# Patient Record
Sex: FEMALE | Race: BLACK OR AFRICAN AMERICAN | Employment: FULL TIME | URBAN - METROPOLITAN AREA
[De-identification: names, ages, dates, MRNs, and addresses within clinical notes are randomized per-mention and may not be internally consistent; named-entity substitution may affect disease eponyms.]

---

## 2017-09-25 ENCOUNTER — HOSPITAL ENCOUNTER (OUTPATIENT)
Dept: ULTRASOUND IMAGING | Age: 42
Discharge: HOME | End: 2017-09-25
Attending: INTERNAL MEDICINE
Payer: COMMERCIAL

## 2017-09-25 DIAGNOSIS — E04.8 MEDIASTINAL GOITER: ICD-10-CM

## 2017-09-25 PROCEDURE — 76536 US EXAM OF HEAD AND NECK: CPT

## 2017-10-05 ENCOUNTER — HOSPITAL ENCOUNTER (OUTPATIENT)
Dept: NUCLEAR MEDICINE | Age: 42
Discharge: HOME | End: 2017-10-05
Payer: COMMERCIAL

## 2017-10-05 DIAGNOSIS — E05.90 HYPERTHYROIDISM: ICD-10-CM

## 2017-10-06 ENCOUNTER — HOSPITAL ENCOUNTER (OUTPATIENT)
Dept: NUCLEAR MEDICINE | Age: 42
Discharge: HOME | End: 2017-10-06
Payer: COMMERCIAL

## 2017-10-06 PROCEDURE — 78014 THYROID IMAGING W/BLOOD FLOW: CPT

## 2017-10-14 ENCOUNTER — HOSPITAL ENCOUNTER (EMERGENCY)
Age: 42
Discharge: HOME | End: 2017-10-14
Attending: EMERGENCY MEDICINE
Payer: COMMERCIAL

## 2017-10-14 VITALS
SYSTOLIC BLOOD PRESSURE: 134 MMHG | TEMPERATURE: 98.4 F | WEIGHT: 220 LBS | HEART RATE: 91 BPM | RESPIRATION RATE: 20 BRPM | HEIGHT: 69 IN | BODY MASS INDEX: 32.58 KG/M2 | DIASTOLIC BLOOD PRESSURE: 81 MMHG | OXYGEN SATURATION: 100 %

## 2017-10-14 DIAGNOSIS — S80.211A ABRASION OF KNEE, INFECTED, RIGHT, INITIAL ENCOUNTER: Primary | ICD-10-CM
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
## 2017-10-14 DIAGNOSIS — L08.9 ABRASION OF KNEE, INFECTED, RIGHT, INITIAL ENCOUNTER: Primary | ICD-10-CM

## 2017-10-14 PROCEDURE — 74011000250 HC RX REV CODE- 250: Performed by: EMERGENCY MEDICINE

## 2017-10-14 PROCEDURE — 90471 IMMUNIZATION ADMIN: CPT

## 2017-10-14 PROCEDURE — 90715 TDAP VACCINE 7 YRS/> IM: CPT | Performed by: EMERGENCY MEDICINE

## 2017-10-14 PROCEDURE — 74011250636 HC RX REV CODE- 250/636: Performed by: EMERGENCY MEDICINE

## 2017-10-14 PROCEDURE — 99282 EMERGENCY DEPT VISIT SF MDM: CPT

## 2017-10-14 RX ORDER — METHIMAZOLE 10 MG/1
10 TABLET ORAL DAILY
COMMUNITY
End: 2018-10-14

## 2017-10-14 RX ORDER — PROPRANOLOL HYDROCHLORIDE 120 MG/1
120 CAPSULE, EXTENDED RELEASE ORAL DAILY
COMMUNITY
End: 2018-10-14

## 2017-10-14 RX ORDER — BACITRACIN ZINC 500 UNIT/G
OINTMENT (GRAM) TOPICAL
Status: COMPLETED | OUTPATIENT
Start: 2017-10-14 | End: 2017-10-14

## 2017-10-14 RX ADMIN — TETANUS TOXOID, REDUCED DIPHTHERIA TOXOID AND ACELLULAR PERTUSSIS VACCINE, ADSORBED 0.5 ML: 5; 2.5; 8; 8; 2.5 SUSPENSION INTRAMUSCULAR at 18:37

## 2017-10-14 RX ADMIN — BACITRACIN ZINC: 500 OINTMENT TOPICAL at 18:49

## 2017-10-14 NOTE — ED PROVIDER NOTES
HPI Comments: 6:11 Momo Delvalle is a 36 y.o. female with PMHx of Grave's disease presenting to the ED c/o right knee injury after fall 5 days ago. Pt states her foot got caught in her pants when she was walking and she fell and hit her knee on the ground. Noted abrasion to right knee. States she has been covering abrasion with band-aids since the fall and was unsure if this was the right method of treatment. Reports pain only with movement. Reports recent diagnosis of Grave's disease 6 days ago and expresses concern for infection. States she has never had a tetanus shot. Denies any other symptoms or complaints. Patient is a 36 y.o. female presenting with knee injury. Knee Injury    Pertinent negatives include no back pain. Past Medical History:   Diagnosis Date    Ectopic pregnancy     Hypertension     Ill-defined condition     Graves Disease       Past Surgical History:   Procedure Laterality Date    HX GYN      leep    HX GYN      d&c    HX GYN      elective     HX TUBAL LIGATION  2016         Family History:   Problem Relation Age of Onset    Diabetes Father     Alzheimer Mother        Social History     Social History    Marital status:      Spouse name: N/A    Number of children: N/A    Years of education: N/A     Occupational History    Not on file. Social History Main Topics    Smoking status: Current Every Day Smoker     Packs/day: 0.50     Years: 20.00     Types: Cigarettes    Smokeless tobacco: Never Used    Alcohol use No    Drug use: No    Sexual activity: Yes     Birth control/ protection: Surgical     Other Topics Concern    Not on file     Social History Narrative         ALLERGIES: Latex; Amoxicillin-pot clavulanate; Fluticasone-salmeterol; and Mometasone    Review of Systems   Constitutional: Negative for chills, fatigue and fever. HENT: Negative for congestion, ear pain, rhinorrhea and sore throat.     Eyes: Negative for pain, redness and itching. Respiratory: Negative for cough, chest tightness and shortness of breath. Cardiovascular: Negative for chest pain, palpitations and leg swelling. Gastrointestinal: Negative for abdominal pain, diarrhea, nausea and vomiting. Genitourinary: Negative for decreased urine volume, dysuria, flank pain, hematuria and pelvic pain. Musculoskeletal: Negative for arthralgias, back pain, joint swelling and myalgias. Skin: Positive for wound (abrasion to right knee ). Negative for color change, pallor and rash. Neurological: Negative for dizziness, weakness and headaches. Hematological: Negative for adenopathy. Does not bruise/bleed easily. Vitals:    10/14/17 1742   BP: 134/81   Pulse: 91   Resp: 20   Temp: 98.4 °F (36.9 °C)   SpO2: 100%   Weight: 99.8 kg (220 lb)   Height: 5' 9\" (1.753 m)            Physical Exam   Constitutional: No distress. HENT:   Head: Normocephalic and atraumatic. Mouth/Throat: Oropharynx is clear and moist.   Eyes: Conjunctivae and EOM are normal. Pupils are equal, round, and reactive to light. Neck: Normal range of motion. Neck supple. Cardiovascular: Normal rate, regular rhythm and normal heart sounds. No murmur heard. Pulmonary/Chest: Effort normal and breath sounds normal. She has no wheezes. She has no rales. Abdominal: Soft. Bowel sounds are normal. She exhibits no distension. There is no tenderness. Musculoskeletal: Normal range of motion. She exhibits no edema or deformity. Right knee: She exhibits normal range of motion, no ecchymosis, no deformity, normal alignment and no bony tenderness. No tenderness found. Left knee: Normal.        Legs:  Abrasion right knee, superficial, 2 cm x 2 cm   Lymphadenopathy:     She has no cervical adenopathy. Neurological: She is alert. She exhibits normal muscle tone. Coordination normal.   Skin: Skin is warm and dry. No rash noted. She is not diaphoretic.    2 cm x 2 cm abrasion below right patella. No surrounding erythema or warmth. No drainage. Psychiatric: She has a normal mood and affect. Her behavior is normal.        [facility]  ED Course       Procedures      Vitals:  Patient Vitals for the past 12 hrs:   Temp Pulse Resp BP SpO2   10/14/17 1742 98.4 °F (36.9 °C) 91 20 134/81 100 %       Medications Ordered:  Medications   diph,Pertuss(AC),Tet Vac-PF (BOOSTRIX) suspension 0.5 mL (not administered)   bacitracin zinc (BACITRACIN) 500 unit/gram ointment (not administered)       Lab Findings:  No results found for this or any previous visit (from the past 12 hour(s)). X-ray, CT or radiology findings or impressions:  No orders to display       Progress notes, consult notes, or additional procedure notes:    Superficial abrasion to right knee with no signs of infection. [place] knee rule negative. Tetanus updated. Instructed on wound care and topical abx application, return precautions with signs of infection      Diagnosis:   1. Abrasion of knee, infected, right, initial encounter    2. Fall, initial encounter        Disposition: Discharge    Follow-up Information     None           Patient's Medications   Start Taking    No medications on file   Continue Taking    AMLODIPINE (NORVASC) 5 MG TABLET        METHIMAZOLE (TAPAZOLE) 10 MG TABLET    Take 10 mg by mouth daily. Indications: 3 tabs    PROPRANOLOL LA (INDERAL LA) 120 MG SR CAPSULE    Take 120 mg by mouth daily. These Medications have changed    No medications on file   Stop Taking    DIAZEPAM (VALIUM) 5 MG TABLET    Take 1 Tab by mouth nightly as needed (muscle pain). Max Daily Amount: 5 mg.     METOPROLOL SUCCINATE (TOPROL-XL) 25 MG XL TABLET           Scribe Attestation     Chalo Anderson acting as a scribe for and in the presence of Gifty Crowley MD      October 14, 2017 at 6:29 PM       Provider Attestation:      I personally performed the services described in the documentation, reviewed the documentation, as recorded by the scribe in my presence, and it accurately and completely records my words and actions.  October 14, 2017 at 6:29 PM - Ira Chiang MD

## 2017-10-14 NOTE — ED TRIAGE NOTES
Pt states she fell on Tuesday. Has healing abrasion to right knee.  Wants to be checked out because she was dx'd with Graves disease on Monday and she is concerned she may have an infection

## 2017-10-14 NOTE — DISCHARGE INSTRUCTIONS
APPLY ANTIBIOTIC OINTMENT (SUCH AS BACITRACIN) TO THE AFFECTED AREA TWO TIMES PER DAY. IF YOU HAVE SEVERE PAIN, REDNESS, DRAINAGE, WARMTH, FEVERS OR ANY WORRYING SIGNS THEN RETURN TO BE CHECKED AGAIN. Scrapes (Abrasions): Care Instructions  Your Care Instructions  Scrapes (abrasions) are wounds where your skin has been rubbed or torn off. Most scrapes do not go deep into the skin, but some may remove several layers of skin. Scrapes usually don't bleed much, but they may ooze pinkish fluid. Scrapes on the head or face may appear worse than they are. They may bleed a lot because of the good blood supply to this area. Most scrapes heal well and may not need a bandage. They usually heal within 3 to 7 days. A large, deep scrape may take 1 to 2 weeks or longer to heal. A scab may form on some scrapes. Follow-up care is a key part of your treatment and safety. Be sure to make and go to all appointments, and call your doctor if you are having problems. It's also a good idea to know your test results and keep a list of the medicines you take. How can you care for yourself at home? · If your doctor told you how to care for your wound, follow your doctor's instructions. If you did not get instructions, follow this general advice:  ¨ Wash the scrape with clean water 2 times a day. Don't use hydrogen peroxide or alcohol, which can slow healing. ¨ You may cover the scrape with a thin layer of petroleum jelly, such as Vaseline, and a nonstick bandage. ¨ Apply more petroleum jelly and replace the bandage as needed. · Prop up the injured area on a pillow anytime you sit or lie down during the next 3 days. Try to keep it above the level of your heart. This will help reduce swelling. · Be safe with medicines. Take pain medicines exactly as directed. ¨ If the doctor gave you a prescription medicine for pain, take it as prescribed.   ¨ If you are not taking a prescription pain medicine, ask your doctor if you can take an over-the-counter medicine. When should you call for help? Call your doctor now or seek immediate medical care if:  · You have signs of infection, such as:  ¨ Increased pain, swelling, warmth, or redness around the scrape. ¨ Red streaks leading from the scrape. ¨ Pus draining from the scrape. ¨ A fever. · The scrape starts to bleed, and blood soaks through the bandage. Oozing small amounts of blood is normal.  Watch closely for changes in your health, and be sure to contact your doctor if the scrape is not getting better each day. Where can you learn more? Go to http://margie-nicholas.info/. Enter A374 in the search box to learn more about \"Scrapes (Abrasions): Care Instructions. \"  Current as of: March 20, 2017  Content Version: 11.3  © 9853-5072 [employer]. Care instructions adapted under license by [employer] (which disclaims liability or warranty for this information). If you have questions about a medical condition or this instruction, always ask your healthcare professional. [place] [address] any warranty or liability for your use of this information.

## 2018-10-14 ENCOUNTER — APPOINTMENT (OUTPATIENT)
Dept: GENERAL RADIOLOGY | Age: 43
End: 2018-10-14
Attending: PHYSICIAN ASSISTANT
Payer: COMMERCIAL

## 2018-10-14 ENCOUNTER — HOSPITAL ENCOUNTER (EMERGENCY)
Age: 43
Discharge: HOME | End: 2018-10-14
Attending: EMERGENCY MEDICINE
Payer: COMMERCIAL

## 2018-10-14 VITALS
OXYGEN SATURATION: 100 % | BODY MASS INDEX: 32.58 KG/M2 | HEIGHT: 69 IN | HEART RATE: 89 BPM | DIASTOLIC BLOOD PRESSURE: 84 MMHG | RESPIRATION RATE: 18 BRPM | SYSTOLIC BLOOD PRESSURE: 150 MMHG | TEMPERATURE: 98.6 F | WEIGHT: 220 LBS

## 2018-10-14 DIAGNOSIS — J06.9 VIRAL URI WITH COUGH: Primary | ICD-10-CM

## 2018-10-14 PROCEDURE — 71046 X-RAY EXAM CHEST 2 VIEWS: CPT

## 2018-10-14 PROCEDURE — 99282 EMERGENCY DEPT VISIT SF MDM: CPT

## 2018-10-14 RX ORDER — BENZONATATE 100 MG/1
100 CAPSULE ORAL
Qty: 30 CAP | Refills: 0 | Status: SHIPPED | OUTPATIENT
Start: 2018-10-14 | End: 2018-10-14

## 2018-10-14 RX ORDER — LORATADINE AND PSEUDOEPHEDRINE 10; 240 MG/1; MG/1
1 TABLET, EXTENDED RELEASE ORAL DAILY
Qty: 10 TAB | Refills: 0 | Status: SHIPPED | OUTPATIENT
Start: 2018-10-14

## 2018-10-14 RX ORDER — FLUTICASONE PROPIONATE 50 MCG
2 SPRAY, SUSPENSION (ML) NASAL DAILY
Qty: 1 BOTTLE | Refills: 0 | Status: SHIPPED | OUTPATIENT
Start: 2018-10-14

## 2018-10-14 RX ORDER — BENZONATATE 100 MG/1
100 CAPSULE ORAL
Qty: 30 CAP | Refills: 0 | Status: SHIPPED | OUTPATIENT
Start: 2018-10-14 | End: 2018-10-21

## 2018-10-14 RX ORDER — FLUTICASONE PROPIONATE 50 MCG
2 SPRAY, SUSPENSION (ML) NASAL DAILY
Qty: 1 BOTTLE | Refills: 0 | Status: SHIPPED | OUTPATIENT
Start: 2018-10-14 | End: 2018-10-14

## 2018-10-14 RX ORDER — ALBUTEROL SULFATE 90 UG/1
AEROSOL, METERED RESPIRATORY (INHALATION)
COMMUNITY

## 2018-10-14 RX ORDER — LORATADINE AND PSEUDOEPHEDRINE 10; 240 MG/1; MG/1
1 TABLET, EXTENDED RELEASE ORAL DAILY
Qty: 10 TAB | Refills: 0 | Status: SHIPPED | OUTPATIENT
Start: 2018-10-14 | End: 2018-10-14

## 2018-10-14 NOTE — ED PROVIDER NOTES
EMERGENCY DEPARTMENT HISTORY AND PHYSICAL EXAM 
 
5:32 PM 
 
 
Date: 10/14/2018 Patient Name: Kristina Lee History of Presenting Illness Chief Complaint Patient presents with  Cough  Nasal Congestion History Provided By: Patient Chief Complaint: cough Duration: 1 Weeks Modifying Factors:  Flonase,  
Associated Symptoms: congestion, cough Additional History (Context): Kristina Lee is a 39 y.o. female with hypertension and Graves Disease  who presents with upper respiratory symptoms. She has nasal congestion and cough, sore throat earlier in the week. Productive cough. Symptoms have been intermittent. Everyone at home is also sick. She talked to her doctor who recommended OTC meds but she has not taken anything. Denies chest pain or fevers. PCP: Grace Alexander MD 
 
Current Outpatient Prescriptions Medication Sig Dispense Refill  METHIMAZOLE PO Take 2.5 mg by mouth daily.  albuterol (PROVENTIL HFA, VENTOLIN HFA, PROAIR HFA) 90 mcg/actuation inhaler Take  by inhalation.  loratadine-pseudoephedrine (CLARITIN-D 24 HOUR)  mg per tablet Take 1 Tab by mouth daily. 10 Tab 0  
 benzonatate (TESSALON PERLES) 100 mg capsule Take 1 Cap by mouth three (3) times daily as needed for Cough for up to 7 days. 30 Cap 0  
 fluticasone (FLONASE) 50 mcg/actuation nasal spray 2 Sprays by Both Nostrils route daily. 1 Bottle 0  
 amLODIPine (NORVASC) 5 mg tablet   3 Past History Past Medical History: 
Past Medical History:  
Diagnosis Date  Ectopic pregnancy  Hypertension  Ill-defined condition Graves Disease Past Surgical History: 
Past Surgical History:  
Procedure Laterality Date  HX GYN    
 leep  HX GYN    
 d&c  
 HX GYN    
 elective   HX TUBAL LIGATION  2016 Family History: 
Family History Problem Relation Age of Onset  Diabetes Father  Alzheimer Mother Social History: Social History Substance Use Topics  Smoking status: Current Every Day Smoker Packs/day: 0.50 Years: 20.00 Types: Cigarettes  Smokeless tobacco: Never Used  Alcohol use No  
 
 
Allergies: Allergies Allergen Reactions  Latex Other (comments) Topical burning  Amoxicillin-Pot Clavulanate Other (comments) Yeast infection  Fluticasone-Salmeterol Unknown (comments)  Mometasone Unknown (comments) Review of Systems Review of Systems Constitutional: Negative for fever. HENT: Positive for congestion. Negative for facial swelling. Eyes: Negative for visual disturbance. Respiratory: Positive for cough. Negative for shortness of breath. Cardiovascular: Negative for chest pain. Gastrointestinal: Negative for abdominal pain. Genitourinary: Negative for dysuria. Musculoskeletal: Negative for neck pain. Skin: Negative for rash. Neurological: Negative for dizziness. Psychiatric/Behavioral: Negative for confusion. All other systems reviewed and are negative. Physical Exam  
 
Visit Vitals  /84 (BP 1 Location: Left arm, BP Patient Position: At rest)  Pulse 89  Temp 98.6 °F (37 °C)  Resp 18  Ht 5' 8.5\" (1.74 m)  Wt 99.8 kg (220 lb)  LMP 09/18/2018  SpO2 100%  BMI 32.96 kg/m2 Physical Exam  
Constitutional: She is oriented to person, place, and time. She appears well-developed and well-nourished. HENT:  
Head: Normocephalic. Right Ear: Tympanic membrane, external ear and ear canal normal.  
Left Ear: Tympanic membrane, external ear and ear canal normal.  
Nose: Nose normal. Right sinus exhibits no maxillary sinus tenderness and no frontal sinus tenderness. Left sinus exhibits no maxillary sinus tenderness and no frontal sinus tenderness.   
Mouth/Throat: Uvula is midline, oropharynx is clear and moist and mucous membranes are normal. No oropharyngeal exudate, posterior oropharyngeal edema, posterior oropharyngeal erythema or tonsillar abscesses. Eyes: Conjunctivae are normal.  
Neck: Normal range of motion. Neck supple. Cardiovascular: Normal rate, regular rhythm and normal heart sounds. Pulmonary/Chest: Effort normal. She has wheezes. Mild wheezing Musculoskeletal: Normal range of motion. Lymphadenopathy:  
  She has no cervical adenopathy. Neurological: She is alert and oriented to person, place, and time. Skin: Skin is warm and dry. Psychiatric: She has a normal mood and affect. Nursing note and vitals reviewed. Diagnostic Study Results Labs - No results found for this or any previous visit (from the past 12 hour(s)). Radiologic Studies -  
XR CHEST PA LAT    (Results Pending) Medical Decision Making I am the first provider for this patient. I reviewed the vital signs, available nursing notes, past medical history, past surgical history, family history and social history. Vital Signs-Reviewed the patient's vital signs. Records Reviewed: Nursing Notes (Time of Review: 5:32 PM) 
 
ED Course: Progress Notes, Reevaluation, and Consults: 
 
 
Provider Notes (Medical Decision Making): MDM Number of Diagnoses or Management Options Diagnosis management comments: ENT exam normal.  Congestion and mild wheezing. Afebrile, vitals stable. Recommend Claritin D, flonase, cough meds. No signs of infection on CXR. Does not require abx. Discussed treatment plan, return precautions, symptomatic relief, and expected time to improvement. All questions answered. Patient is stable for discharge and outpatient management. Diagnosis Clinical Impression: 1. Viral URI with cough Disposition:  
 
Follow-up Information Follow up With Details Comments Contact Info Mariam Gill MD In 1 week If symptoms do not improve [address] [facility] [address] [id number] [phone] [id number] [facility] EMERGENCY DEPT  Immediately if symptoms worsen [address] [id number] [phone] Patient's Medications Start Taking BENZONATATE (TESSALON PERLES) 100 MG CAPSULE    Take 1 Cap by mouth three (3) times daily as needed for Cough for up to 7 days. FLUTICASONE (FLONASE) 50 MCG/ACTUATION NASAL SPRAY    2 Sprays by Both Nostrils route daily. LORATADINE-PSEUDOEPHEDRINE (CLARITIN-D 24 HOUR)  MG PER TABLET    Take 1 Tab by mouth daily. Continue Taking ALBUTEROL (PROVENTIL HFA, VENTOLIN HFA, PROAIR HFA) 90 MCG/ACTUATION INHALER    Take  by inhalation. AMLODIPINE (NORVASC) 5 MG TABLET METHIMAZOLE PO    Take 2.5 mg by mouth daily. These Medications have changed No medications on file Stop Taking METHIMAZOLE (TAPAZOLE) 10 MG TABLET    Take 10 mg by mouth daily. Indications: 3 tabs PROPRANOLOL LA (INDERAL LA) 120 MG SR CAPSULE    Take 120 mg by mouth daily. _______________________________ Attestations: 
Scribe Attestation Arturo KIA Bedolla PA-C acting as a scribe for and in the presence of [employer], [employer] October 14, 2018 at 6:17 PM 
    
Provider Attestation:     
I personally performed the services described in the documentation, reviewed the documentation, as recorded by the scribe in my presence, and it accurately and completely records my words and actions. October 14, 2018 at 6:17 PM - AMBER Nguyễn 
_______________________________

## 2018-10-14 NOTE — DISCHARGE INSTRUCTIONS
Upper Respiratory Infection (Cold): Care Instructions  Your Care Instructions    An upper respiratory infection, or URI, is an infection of the nose, sinuses, or throat. URIs are spread by coughs, sneezes, and direct contact. The common cold is the most frequent kind of URI. The flu and sinus infections are other kinds of URIs. Almost all URIs are caused by viruses. Antibiotics won't cure them. But you can treat most infections with home care. This may include drinking lots of fluids and taking over-the-counter pain medicine. You will probably feel better in 4 to 10 days. The doctor has checked you carefully, but problems can develop later. If you notice any problems or new symptoms, get medical treatment right away. Follow-up care is a key part of your treatment and safety. Be sure to make and go to all appointments, and call your doctor if you are having problems. It's also a good idea to know your test results and keep a list of the medicines you take. How can you care for yourself at home? · To prevent dehydration, drink plenty of fluids, enough so that your urine is light yellow or clear like water. Choose water and other caffeine-free clear liquids until you feel better. If you have kidney, heart, or liver disease and have to limit fluids, talk with your doctor before you increase the amount of fluids you drink. · Take an over-the-counter pain medicine, such as acetaminophen (Tylenol), ibuprofen (Advil, Motrin), or naproxen (Aleve). Read and follow all instructions on the label. · Before you use cough and cold medicines, check the label. These medicines may not be safe for young children or for people with certain health problems. · Be careful when taking over-the-counter cold or flu medicines and Tylenol at the same time. Many of these medicines have acetaminophen, which is Tylenol. Read the labels to make sure that you are not taking more than the recommended dose.  Too much acetaminophen (Tylenol) can be harmful. · Get plenty of rest.  · Do not smoke or allow others to smoke around you. If you need help quitting, talk to your doctor about stop-smoking programs and medicines. These can increase your chances of quitting for good. When should you call for help? Call 911 anytime you think you may need emergency care. For example, call if:    · You have severe trouble breathing.    Call your doctor now or seek immediate medical care if:    · You seem to be getting much sicker.     · You have new or worse trouble breathing.     · You have a new or higher fever.     · You have a new rash.    Watch closely for changes in your health, and be sure to contact your doctor if:    · You have a new symptom, such as a sore throat, an earache, or sinus pain.     · You cough more deeply or more often, especially if you notice more mucus or a change in the color of your mucus.     · You do not get better as expected. Where can you learn more? Go to http://margie-nicholas.info/. Enter D993 in the search box to learn more about \"Upper Respiratory Infection (Cold): Care Instructions. \"  Current as of: December 6, 2017  Content Version: 11.8  © 7272-2301 [employer]. Care instructions adapted under license by [employer] (which disclaims liability or warranty for this information). If you have questions about a medical condition or this instruction, always ask your healthcare professional. [place] [address] any warranty or liability for your use of this information. Saline Nasal Washes: Care Instructions  Your Care Instructions  Saline nasal washes help keep the nasal passages open by washing out thick or dried mucus. This simple remedy can help relieve symptoms of allergies, sinusitis, and colds.  It also can make the nose feel more comfortable by keeping the mucous membranes moist. You may notice a little burning sensation in your nose the first few times you use the solution, but this usually gets better in a few days. Follow-up care is a key part of your treatment and safety. Be sure to make and go to all appointments, and call your doctor if you are having problems. It's also a good idea to know your test results and keep a list of the medicines you take. How can you care for yourself at home? · You can buy premixed saline solution in a squeeze bottle or other sinus rinse products at a drugstore. Read and follow the instructions on the label. · You also can make your own saline solution by adding 1 teaspoon of salt and 1 teaspoon of baking soda to 2 cups of distilled water. · If you use a homemade solution, pour a small amount into a clean bowl. Using a rubber bulb syringe, squeeze the syringe and place the tip in the salt water. Pull a small amount of the salt water into the syringe by relaxing your hand. · Sit down with your head tilted slightly back. Do not lie down. Put the tip of the bulb syringe or the squeeze bottle a little way into one of your nostrils. Gently drip or squirt a few drops into the nostril. Repeat with the other nostril. Some sneezing and gagging are normal at first.  · Gently blow your nose. · Wipe the syringe or bottle tip clean after each use. · Repeat this 2 or 3 times a day. · Use nasal washes gently if you have nosebleeds often. When should you call for help? Watch closely for changes in your health, and be sure to contact your doctor if:    · You often get nosebleeds.     · You have problems doing the nasal washes. Where can you learn more? Go to http://margie-nicholas.info/. Enter [id number] in the search box to learn more about \"Saline Nasal Washes: Care Instructions. \"  Current as of: March 28, 2018  Content Version: 11.8  © 7796-4928 [employer]. Care instructions adapted under license by [employer] (which disclaims liability or warranty for this information).  If you have questions about a medical condition or this instruction, always ask your healthcare professional. [place] [address] any warranty or liability for your use of this information.

## 2019-10-15 ENCOUNTER — HOSPITAL ENCOUNTER (EMERGENCY)
Age: 44
Discharge: HOME | End: 2019-10-15
Attending: EMERGENCY MEDICINE
Payer: COMMERCIAL

## 2019-10-15 VITALS
HEART RATE: 93 BPM | TEMPERATURE: 98.3 F | OXYGEN SATURATION: 97 % | BODY MASS INDEX: 34.1 KG/M2 | SYSTOLIC BLOOD PRESSURE: 141 MMHG | WEIGHT: 225 LBS | DIASTOLIC BLOOD PRESSURE: 83 MMHG | HEIGHT: 68 IN | RESPIRATION RATE: 20 BRPM

## 2019-10-15 DIAGNOSIS — R35.0 URINARY FREQUENCY: ICD-10-CM

## 2019-10-15 DIAGNOSIS — R30.0 DYSURIA: Primary | ICD-10-CM

## 2019-10-15 PROCEDURE — 74011250637 HC RX REV CODE- 250/637: Performed by: PHYSICIAN ASSISTANT

## 2019-10-15 PROCEDURE — 99283 EMERGENCY DEPT VISIT LOW MDM: CPT

## 2019-10-15 RX ORDER — NITROFURANTOIN MACROCRYSTALS 50 MG/1
100 CAPSULE ORAL EVERY 6 HOURS
Status: DISCONTINUED | OUTPATIENT
Start: 2019-10-15 | End: 2019-10-16 | Stop reason: HOSPADM

## 2019-10-15 RX ORDER — PHENAZOPYRIDINE HYDROCHLORIDE 200 MG/1
200 TABLET, FILM COATED ORAL 3 TIMES DAILY
Qty: 6 TAB | Refills: 0 | Status: SHIPPED | OUTPATIENT
Start: 2019-10-15 | End: 2019-10-17

## 2019-10-15 RX ORDER — NITROFURANTOIN 25; 75 MG/1; MG/1
100 CAPSULE ORAL 2 TIMES DAILY
Qty: 10 CAP | Refills: 0 | Status: SHIPPED | OUTPATIENT
Start: 2019-10-15 | End: 2019-10-20

## 2019-10-15 RX ADMIN — NITROFURANTOIN MACROCRYSTALS 100 MG: 50 CAPSULE ORAL at 21:11

## 2019-10-16 NOTE — DISCHARGE INSTRUCTIONS
Patient Education        Painful Urination (Dysuria): Care Instructions  Your Care Instructions  Burning pain with urination (dysuria) is a common symptom of a urinary tract infection or other urinary problems. The bladder may become inflamed. This can cause pain when the bladder fills and empties. You may also feel pain if the tube that carries urine from the bladder to the outside of the body (urethra) gets irritated or infected. Sexually transmitted infections (STIs) also may cause pain when you urinate. Sometimes the pain can be caused by things other than an infection. The urethra can be irritated by soaps, perfumes, or foreign objects in the urethra. Kidney stones can cause pain when they pass through the urethra. The cause may be hard to find. You may need tests. Treatment for painful urination depends on the cause. Follow-up care is a key part of your treatment and safety. Be sure to make and go to all appointments, and call your doctor if you are having problems. It's also a good idea to know your test results and keep a list of the medicines you take. How can you care for yourself at home? · Drink extra water for the next day or two. This will help make the urine less concentrated. (If you have kidney, heart, or liver disease and have to limit fluids, talk with your doctor before you increase the amount of fluids you drink.)  · Avoid drinks that are carbonated or have caffeine. They can irritate the bladder. · Urinate often. Try to empty your bladder each time. For women:  · Urinate right after you have sex. · After going to the bathroom, wipe from front to back. · Avoid douches, bubble baths, and feminine hygiene sprays. And avoid other feminine hygiene products that have deodorants. When should you call for help? Call your doctor now or seek immediate medical care if:    · You have new symptoms, such as fever, nausea, or vomiting.     · You have new or worse symptoms of a urinary problem.  For example:  ? You have blood or pus in your urine. ? You have chills or body aches. ? It hurts worse to urinate. ? You have groin or belly pain. ? You have pain in your back just below your rib cage (the flank area).    Watch closely for changes in your health, and be sure to contact your doctor if you have any problems. Where can you learn more? Go to http://margie-nicholas.info/. Enter Q115 in the search box to learn more about \"Painful Urination (Dysuria): Care Instructions. \"  Current as of: December 19, 2018  Content Version: 12.2  © 1058-7392 [employer]. Care instructions adapted under license by [employer] (which disclaims liability or warranty for this information). If you have questions about a medical condition or this instruction, always ask your healthcare professional. [address] any warranty or liability for your use of this information.

## 2019-10-16 NOTE — ED PROVIDER NOTES
EMERGENCY DEPARTMENT HISTORY AND PHYSICAL EXAM    Date: 10/15/2019  Patient Name: Yoon Bowman    History of Presenting Illness     Chief Complaint   Patient presents with    Urinary Pain         History Provided By: Patient    Chief Complaint: Dysuria, urinary frequency  Duration: Few days  Timing: Worsening  Location: N/A  Quality: Burning  Severity: Moderate  Modifying Factors: Worse after intercourse  Associated Symptoms: none       Additional History (Context): oYon Bowman is a 38 y.o. female with a history of hypertension and Graves' disease who presents today for concerns of UTI. Patient reports she typically gets these after intercourse. Patient reports her symptoms are dysuria and urinary frequency. Patient denies any fevers or chills. Denies any nausea vomiting or back pain. Patient denies any concerns for STDs or vaginal discharge. PCP: Marc Marmolejo MD    Current Facility-Administered Medications   Medication Dose Route Frequency Provider Last Rate Last Dose    nitrofurantoin (MACRODANTIN) capsule 100 mg  100 mg Oral Q6H Marzena Perez PA   100 mg at 10/15/19 2111     Current Outpatient Medications   Medication Sig Dispense Refill    nitrofurantoin, macrocrystal-monohydrate, (MACROBID) 100 mg capsule Take 1 Cap by mouth two (2) times a day for 5 days. 10 Cap 0    phenazopyridine (PYRIDIUM) 200 mg tablet Take 1 Tab by mouth three (3) times daily for 2 days. 6 Tab 0    METHIMAZOLE PO Take 2.5 mg by mouth daily.  albuterol (PROVENTIL HFA, VENTOLIN HFA, PROAIR HFA) 90 mcg/actuation inhaler Take  by inhalation.  fluticasone (FLONASE) 50 mcg/actuation nasal spray 2 Sprays by Both Nostrils route daily. 1 Bottle 0    loratadine-pseudoephedrine (CLARITIN-D 24 HOUR)  mg per tablet Take 1 Tab by mouth daily.  10 Tab 0    amLODIPine (NORVASC) 5 mg tablet   3       Past History     Past Medical History:  Past Medical History:   Diagnosis Date    Ectopic pregnancy     Hypertension     Ill-defined condition     Graves Disease       Past Surgical History:  Past Surgical History:   Procedure Laterality Date    HX GYN      leep    HX GYN      d&c    HX GYN      elective     HX TUBAL LIGATION  2016       Family History:  Family History   Problem Relation Age of Onset    Diabetes Father     Alzheimer Mother        Social History:  Social History     Tobacco Use    Smoking status: Current Every Day Smoker     Packs/day: 0.50     Years: 20.00     Pack years: 10.00     Types: Cigarettes    Smokeless tobacco: Never Used   Substance Use Topics    Alcohol use: No     Alcohol/week: 0.0 standard drinks    Drug use: No       Allergies: Allergies   Allergen Reactions    Latex Other (comments)     Topical burning      Amoxicillin-Pot Clavulanate Other (comments)     Yeast infection    Fluticasone Propion-Salmeterol Unknown (comments)    Mometasone Unknown (comments)         Review of Systems   Review of Systems   Constitutional: Negative for chills and fever. HENT: Negative for congestion, rhinorrhea and sore throat. Respiratory: Negative for cough and shortness of breath. Cardiovascular: Negative for chest pain. Gastrointestinal: Negative for abdominal pain, blood in stool, constipation, diarrhea, nausea and vomiting. Genitourinary: Positive for dysuria and frequency. Negative for hematuria. Musculoskeletal: Negative for back pain and myalgias. Skin: Negative for rash and wound. Neurological: Negative for dizziness and headaches. All other systems reviewed and are negative. All Other Systems Negative  Physical Exam     Vitals:    10/15/19 1923 10/15/19 2000   BP: 141/83    Pulse: (!) 132 93   Resp: 20    Temp: 98.3 °F (36.8 °C)    SpO2: 97%    Weight: 102.1 kg (225 lb)    Height: 5' 8\" (1.727 m)      Physical Exam   Constitutional: She is oriented to person, place, and time. She appears well-developed and well-nourished. No distress.    HENT: Head: Normocephalic and atraumatic. Eyes: Conjunctivae are normal.   Neck: Normal range of motion. Neck supple. Cardiovascular: Normal rate, regular rhythm and normal heart sounds. Pulmonary/Chest: Effort normal and breath sounds normal. No respiratory distress. She exhibits no tenderness. Abdominal: Soft. Bowel sounds are normal. She exhibits no distension. There is no tenderness. There is no rebound, no guarding and no CVA tenderness. Musculoskeletal: She exhibits no edema or deformity. Neurological: She is alert and oriented to person, place, and time. She has normal reflexes. Skin: Skin is warm and dry. She is not diaphoretic. Psychiatric: She has a normal mood and affect. Nursing note and vitals reviewed. Diagnostic Study Results     Labs -   No results found for this or any previous visit (from the past 12 hour(s)). Radiologic Studies -   No orders to display     CT Results  (Last 48 hours)    None        CXR Results  (Last 48 hours)    None            Medical Decision Making   I am the first provider for this patient. I reviewed the vital signs, available nursing notes, past medical history, past surgical history, family history and social history. Vital Signs-Reviewed the patient's vital signs. Records Reviewed: Nursing Notes and Old Medical Records     Procedures: None   Procedures    Provider Notes (Medical Decision Making):     Differential: UTI, pyelonephritis      Plan: Order UA    9:21 PM  Patient's UA was lost.  Patient does not want to wait for another UA. Will treat empirically for presumed UTI. Have strongly encourage close PCP follow-up  and have discussed limitations of antibiotics with her given there will not be a culture. Patient agrees with the plan and management and states all questions have been thoroughly answered and there are no more remaining questions. Stressed the importance of hydration.        MED RECONCILIATION:  Current Facility-Administered Medications   Medication Dose Route Frequency    nitrofurantoin (MACRODANTIN) capsule 100 mg  100 mg Oral Q6H     Current Outpatient Medications   Medication Sig    nitrofurantoin, macrocrystal-monohydrate, (MACROBID) 100 mg capsule Take 1 Cap by mouth two (2) times a day for 5 days.  phenazopyridine (PYRIDIUM) 200 mg tablet Take 1 Tab by mouth three (3) times daily for 2 days.  METHIMAZOLE PO Take 2.5 mg by mouth daily.  albuterol (PROVENTIL HFA, VENTOLIN HFA, PROAIR HFA) 90 mcg/actuation inhaler Take  by inhalation.  fluticasone (FLONASE) 50 mcg/actuation nasal spray 2 Sprays by Both Nostrils route daily.  loratadine-pseudoephedrine (CLARITIN-D 24 HOUR)  mg per tablet Take 1 Tab by mouth daily.  amLODIPine (NORVASC) 5 mg tablet        Disposition:  Home     DISCHARGE NOTE:   Pt has been reexamined. Patient has no new complaints, changes, or physical findings. Care plan outlined and precautions discussed. Results of workup were reviewed with the patient. All medications were reviewed with the patient. All of pt's questions and concerns were addressed. Patient was instructed and agrees to follow up with PCP as well as to return to the ED upon further deterioration. Patient is ready to go home. Follow-up Information     Follow up With Specialties Details Why Contact Bertrand    [facility] DEPT Emergency Medicine  As needed [address] [place] [address] [address] [facility]    Luz Elena Mann MD Internal Medicine In 2 days  [address] [id number] [phone]            Current Discharge Medication List      START taking these medications    Details   nitrofurantoin, macrocrystal-monohydrate, (MACROBID) 100 mg capsule Take 1 Cap by mouth two (2) times a day for 5 days. Qty: 10 Cap, Refills: 0      phenazopyridine (PYRIDIUM) 200 mg tablet Take 1 Tab by mouth three (3) times daily for 2 days.   Qty: 6 Tab, Refills: 0 Diagnosis     Clinical Impression:   1. Dysuria    2.  Urinary frequency

## 2020-12-07 NOTE — PROGRESS NOTES
[facility]  [address], [address]  Phone: [phone]  Fax: [phone]        INITIAL CONSULTATION      HISTORY OF PRESENT ILLNESS:  Candace Tucker is a 54 y.o. female whom is self-referred secondary to low back pain occasionally radiating into the BLE in a S1 distribution to the calves. She rates her pain 0-4/10. Pt was previously seen by me in . Notes not available. She was seen at that time for lower back pain. She has had flare ups about 2 x/year lasting 1-2 days. Her pain is exacerbated by sitting. She completed PT in . She is noncompliant with her HEP. Patient denies previous spinal surgery or injections. Pt denies change in bowel or bladder habits. Pt denies fever, weight loss, or skin changes. Pt denies h/o stomach ulcers, bleeding disorders, or DM. PmHx of obesity, HTN, graves disease, tubal ligation. Note from Dr. Naomi Person dated 2020 indicating patient was seen with c/o chest cold. Negative for COVID19. Dx with acute bronchitis. Note from Dr. Naomi Person dated 2020 indicating patient was seen for concerns with medication. She was not tolerating levaquin and it was discontinued. The patient is LHD.  reviewed. Body mass index is 36.8 kg/m². PCP: Kj Jaimes MD    Past Medical History:   Diagnosis Date    Allergies     Ectopic pregnancy     Hypertension     Ill-defined condition     Graves Disease          Past Surgical History:   Procedure Laterality Date    HX GYN      leep    HX GYN      d&c    HX GYN      elective     HX TUBAL LIGATION  2016         Social History     Tobacco Use    Smoking status: Current Every Day Smoker     Packs/day: 0.50     Years: 20.00     Pack years: 10.00     Types: Cigarettes    Smokeless tobacco: Never Used   Substance Use Topics    Alcohol use: No     Alcohol/week: 0.0 standard drinks       Work status: The patient is employed. Marital status: . Current Outpatient Medications   Medication Sig Dispense Refill    esomeprazole (NexIUM) 40 mg capsule Take  by mouth daily.  ergocalciferol (Vitamin D2) 1,250 mcg (50,000 unit) capsule Take 50,000 Units by mouth.  amLODIPine (NORVASC) 5 mg tablet   3    METHIMAZOLE PO Take 2.5 mg by mouth daily.  albuterol (PROVENTIL HFA, VENTOLIN HFA, PROAIR HFA) 90 mcg/actuation inhaler Take  by inhalation.  fluticasone (FLONASE) 50 mcg/actuation nasal spray 2 Sprays by Both Nostrils route daily. (Patient not taking: Reported on 12/8/2020) 1 Bottle 0    loratadine-pseudoephedrine (CLARITIN-D 24 HOUR)  mg per tablet Take 1 Tab by mouth daily. (Patient not taking: Reported on 12/8/2020) 10 Tab 0       Allergies   Allergen Reactions    Latex Other (comments)     Latex comdons/ Topical burning      Amoxicillin-Pot Clavulanate Other (comments)     Yeast infection    Fluticasone Propion-Salmeterol Unknown (comments)     Pt states she is not allergic    Mometasone Unknown (comments)            Family History   Problem Relation Age of Onset    Diabetes Father     Alzheimer Mother          REVIEW OF SYSTEMS  Constitutional symptoms: Negative  Eyes: Negative  Ears, Nose, Throat, and Mouth: Negative  Cardiovascular: Negative  Respiratory: Negative  Genitourinary: Negative  Integumentary (Skin and/or breast): Negative  Musculoskeletal: Positive for low back pain radiating into the BLE. Extremities: Negative for edema.   Endocrine/Rheumatologic: Negative  Hematologic/Lymphatic: Negative  Allergic/Immunologic: Negative  Psychiatric: Negative       PHYSICAL EXAMINATION  Visit Vitals  BP (!) 151/76 (BP 1 Location: Left arm, BP Patient Position: Sitting)   Pulse 95   Temp 98.5 °F (36.9 °C)   Resp 16   Ht 5' 8\" (1.727 m)   Wt 242 lb (109.8 kg)   SpO2 99%   BMI 36.80 kg/m²       CONSTITUTIONAL: NAD, A&O x 3  HEART: Regular rate and rhythm  GASTROINTESTINAL: Positive bowel sounds, soft, nontender, and nondistended  LUNGS: Clear to auscultation bilaterally. SKIN: Negative for rash. RANGE OF MOTION: The patient has full passive range of motion in all four extremities. SENSATION: Sensation is intact to light touch throughout. MOTOR:   Straight Leg Raise: Negative, bilateral  Barnard: Negative, bilateral  Deep tendon reflexes are 1 at the biceps, 0 at the triceps, and 1 at the brachioradialis bilaterally. Deep tendon reflexes are 1 at the knees and 0 at the ankles bilaterally. Shoulder AB/Flex Elbow Flex Wrist Ext Elbow Ext Wrist Flex Hand Intrin Tone   Right +4/5 +4/5 +4/5 +4/5 +4/5 +4/5 +4/5   Left +4/5 +4/5 +4/5 +4/5 +4/5 +4/5 +4/5              Hip Flex Knee Ext Knee Flex Ankle DF GTE Ankle PF Tone   Right +4/5 +4/5 +4/5 +4/5 +4/5 +4/5 +4/5   Left +4/5 +4/5 +4/5 +4/5 +4/5 +4/5 +4/5     RADIOGRAPHS  Preliminary reading of L spine plain films dated 12/8/2020 revealed:  Slight anterolisthesis of L5 on S1. No acute pathology identified. These are being sent out for official reading by Dr. Savana Trammell. ASSESSMENT   Diagnoses and all orders for this visit:    1. Lumbar pain  -     AMB POC XRAY, SPINE, LUMBOSACRAL; 2 O    2. Severe obesity ([address]    3. Lumbar neuritis    4. Spondylolisthesis, acquired         IMPRESSIONS/RECOMMENDATIONS:  Patient presents today with c/o low back pain occasionally radiating into the BLE in a S1 distribution to the calves. Multiple treatment options were discussed. I provided her Ibuprofen 800 mg TID to take for 2 weeks. I will refer her to physical therapy with an emphasis on HEP. Patient is neurologically intact. I will see the patient back in 6 week's time or earlier if needed. Written by Meryle Kling, as dictated by Sheri Stevens MD  I examined the patient, reviewed and agree with the note.

## 2020-12-08 ENCOUNTER — OFFICE VISIT (OUTPATIENT)
Dept: ORTHOPEDIC SURGERY | Age: 45
End: 2020-12-08
Payer: COMMERCIAL

## 2020-12-08 VITALS
DIASTOLIC BLOOD PRESSURE: 76 MMHG | TEMPERATURE: 98.5 F | RESPIRATION RATE: 16 BRPM | BODY MASS INDEX: 36.68 KG/M2 | SYSTOLIC BLOOD PRESSURE: 151 MMHG | HEIGHT: 68 IN | HEART RATE: 95 BPM | WEIGHT: 242 LBS | OXYGEN SATURATION: 99 %

## 2020-12-08 DIAGNOSIS — M54.16 LUMBAR NEURITIS: ICD-10-CM

## 2020-12-08 DIAGNOSIS — M43.10 SPONDYLOLISTHESIS, ACQUIRED: ICD-10-CM

## 2020-12-08 DIAGNOSIS — M54.50 LUMBAR PAIN: Primary | ICD-10-CM

## 2020-12-08 DIAGNOSIS — E66.01 SEVERE OBESITY (HCC): ICD-10-CM

## 2020-12-08 PROCEDURE — 72100 X-RAY EXAM L-S SPINE 2/3 VWS: CPT | Performed by: PHYSICAL MEDICINE & REHABILITATION

## 2020-12-08 PROCEDURE — 99204 OFFICE O/P NEW MOD 45 MIN: CPT | Performed by: PHYSICAL MEDICINE & REHABILITATION

## 2020-12-08 RX ORDER — IBUPROFEN 800 MG/1
800 TABLET ORAL
Qty: 45 TAB | Refills: 0 | Status: SHIPPED | OUTPATIENT
Start: 2020-12-08

## 2020-12-08 RX ORDER — ERGOCALCIFEROL 1.25 MG/1
50000 CAPSULE ORAL
COMMUNITY

## 2020-12-08 RX ORDER — ESOMEPRAZOLE MAGNESIUM 40 MG/1
CAPSULE, DELAYED RELEASE ORAL DAILY
COMMUNITY

## 2020-12-08 NOTE — LETTER
12/8/20 Patient: Mary Lou Hurst YOB: 1975 Date of Visit: 12/8/2020 Luis Valenzuela MD 
[facility] [address] [address] VIA Facsimile: [phone] Dear Luis Valenzuela MD, Thank you for referring Ms. Mary Lou Hurst to [address] for evaluation. My notes for this consultation are attached. If you have questions, please do not hesitate to call me. I look forward to following your patient along with you. Sincerely, Sulema Colon MD

## 2020-12-15 ENCOUNTER — TRANSCRIBE ORDER (OUTPATIENT)
Dept: REGISTRATION | Age: 45
End: 2020-12-15

## 2020-12-15 ENCOUNTER — HOSPITAL ENCOUNTER (OUTPATIENT)
Dept: PHYSICAL THERAPY | Age: 45
Discharge: HOME | End: 2020-12-15
Payer: COMMERCIAL

## 2020-12-15 ENCOUNTER — HOSPITAL ENCOUNTER (OUTPATIENT)
Dept: GENERAL RADIOLOGY | Age: 45
Discharge: HOME | End: 2020-12-15
Payer: COMMERCIAL

## 2020-12-15 DIAGNOSIS — J18.0 BRONCHIAL PNEUMONIA: ICD-10-CM

## 2020-12-15 DIAGNOSIS — J18.0 BRONCHIAL PNEUMONIA: Primary | ICD-10-CM

## 2020-12-15 PROCEDURE — 71046 X-RAY EXAM CHEST 2 VIEWS: CPT

## 2020-12-15 PROCEDURE — 97110 THERAPEUTIC EXERCISES: CPT

## 2020-12-15 PROCEDURE — 97162 PT EVAL MOD COMPLEX 30 MIN: CPT

## 2020-12-15 NOTE — PROGRESS NOTES
PT DAILY TREATMENT NOTE/LUMBAR EVAL     Patient Name: Amparo May  Date:12/15/2020  : 1975  [x]  Patient  Verified  Payor: Jaime Kelley / Plan: Coleman Butler / Product Type: HUNTER /    In time: 3:05P  Out time:3:55P  Total Treatment Time (min): 50  Visit #: 1 of 8    Treatment Area: Morbid (severe) obesity due to excess calories [E66.01]  Low back pain [M54.5]  Radiculopathy, lumbar region [M54.16]  Spondylolisthesis, site unspecified [M43.10]  SUBJECTIVE  Pain Level (0-10 scale): 0/10 (at worst: 4/10)  []constant [x]intermittent [x]improving []worsening []no change since onset    Any medication changes, allergies to medications, adverse drug reactions, diagnosis change, or new procedure performed?: [x] No    [] Yes (see summary sheet for update)  Subjective functional status/changes:     PLOF:  working full time as 7th/, participating in recreational activities such as bowling and riding roller coasters  Limitations to PLOF:   Mechanism of Injury: patient has spondylolisthesis which flares periodically  Current symptoms/Complaints: pain, numbness/tingling to LEs intermittently, \"tightness\"  Previous Treatment/Compliance: PT for back  PMHx/Surgical Hx: no surgery  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 Pain usually worsened around holidays. Pain increased about 10 days ago. Walking is ok, better when wearing good, supportive tennis shoes. Patient self reports flexion bias.            40 min [x]Eval                  []Re-Eval       10 min Therapeutic Exercise:  [x] See flow sheet :   Rationale: increase ROM and increase strength to improve the patients ability to perform work activities with increased ease          With   [x] TE   [] TA   [] neuro   [] other: Patient Education: [x] Review HEP    [] Progressed/Changed HEP based on:   [] positioning   [] body mechanics   [] transfers   [] heat/ice application    [x] other: purpose and importance of therapy, diagnosis, prognosis, POC; activity modification      Other Objective/Functional Measures:    Physical Therapy Evaluation - Lumbar Spine ([facility])    SUBJECTIVE  Chief Complaint:    Mechanism of injury:    Symptoms:  Aggravated by:   [] Bending [] Sitting [x] Standing [] Walking   [] Moving [] Cough [] Sneeze [] Valsalva   [] AM  [x] PM  Lying:  [] sup   [] pro   [] sidelying   [] Other: lifting, extension, carrying     Eased by:    [x] Bending [] Sitting [] Standing [] Walking   [] Moving [] AM  [] PM  Lying: [] sup  [] pro  [x] sidelying   [x] Other:stretching hamstrings, warmth/heat     General Health:  Red Flags Indicated? [] Yes    [x] No  [] Yes [x] No Recent weight change (If yes, due to dieting?  [] Yes  [] No)   [] Yes [x] No Weakness in legs during walking  [] Yes [x] No Unremitting pain at night  [] Yes [x] No Abdominal pain or problems  [] Yes [x] No Rectal bleeding  [] Yes [x] No Feet more cold or painful in cold weather  [] Yes [x] No Menstrual irregularities  [] Yes [x] No Blood or pain with urination  [] Yes [x] No Dysfunction of bowel or bladder  [] Yes [x] No Recent illness within past 3 weeks (i.e, cold, flu)  [] Yes [x] No Numbness/tingling in buttock/genitalia region    Past History/Treatments:     Diagnostic Tests: [] Lab work [x] X-rays    [] CT [] MRI     [] Other:  Results: patient reports spondylolisthesis has not worsened with most recent imaging    Functional Status  Prior level of function:  Present functional limitations:  What position do you sleep in?:    OBJECTIVE  Posture:  Lateral Shift: [] R    [] L     [] +  [] -  Kyphosis: [] Increased [] Decreased   []  WNL  Lordosis:  [] Increased [] Decreased   [] WNL  Pelvic symmetry: [x] WNL    [] Other:    Gait:  [] Normal     [] Abnormal:    Active Movements: [] N/A   [] Too acute   [] Other:  ROM % AROM % PROM Comments:pain, area   Forward flexion 40-60 90%     Extension 20-30 WNL     SB right 20-30 90%     SB left 20-30 80%     Rotation right 5-10 80%     Rotation left 5-10 80%       Hip IR limited bilaterally (left more limited than right)    Neuro Screen [] WNL  Myotome/Dermatome/Reflexes:  Comments:    Dural Mobility:  SLR Sitting: [] R    [] L    [] +    [] -  @ (degrees):           Supine: [] R    [] L    [] +    [x] -  @ (degrees):   Slump Test: [] R    [] L    [] +    [] -  @ (degrees):   Prone Knee Bend: [] R    [] L    [] +    [] -     Palpation   TTP bilateral piriformis    Strength   L(0-5) R (0-5) N/T   Hip Flexion (L1,2) 4+ 4 []   Knee Extension (L3,4) 5 5 []   Ankle Dorsiflexion (L4) 4 4 []   Great Toe Extension (L5)   []   Ankle Plantarflexion (S1)   []   Knee Flexion (S1,2)   []   Upper Abdominals   []   Lower Abdominals   []   Paraspinals   []   Back Rotators   []   Gluteus Ryan   []   Other   []     Hip ABD: left 4+, right 5  Hip ext: 3 bilaterally  Ankle DF: left 5 right 4+  Right abd 5      Special Tests  Lumbar:  Lumb.  Compression: [] Pos  [] Neg               Lumbar Distraction:   [] Pos  [] Neg    Quadrant:  [] Pos  [] Neg   [] Flex  [] Ext    Sacroilliac:  Gaenslen's: [] R    [] L    [] +    [] -     Compression: [] +    [] -     Gapping:  [] +    [x] -     Thigh Thrust: [] R    [] L    [] +    [x] -     Leg Length: [] +    [] -   Position:    Crests:    ASIS:     PSIS:    Sacral Sulcus:     Mobility: Standing flex:     Sitting flex:     Supine to sit:     Prone knee bend:         Hip: Marzette Filler:  [] R    [] L    [] +    [x] -     Scour:  [] R    [] L    [] +    [] -     Piriformis: [] R    [] L    [] +    [] -          Deficits: Conner: [] R    [] L    [] +    [] -     Ramos: [] R    [] L    [] +    [] -     Hamstrings 90/90:    Gastrocsoleus (to neutral): Right: Left:       Other tests/comments:     Bilateral ASIS and medial malleoli level    Pain Level (0-10 scale) post treatment: 0/10    ASSESSMENT/Changes in Function: See POC    Patient will continue to benefit from skilled PT services to modify and progress therapeutic interventions, address functional mobility deficits, address ROM deficits, address strength deficits, analyze and address soft tissue restrictions, analyze and cue movement patterns, analyze and modify body mechanics/ergonomics and assess and modify postural abnormalities to attain remaining goals.      [x]  See Plan of Care  []  See progress note/recertification  []  See Discharge Summary         Progress towards goals / Updated goals:    See POC     PLAN  [x]  Upgrade activities as tolerated     [x]  Continue plan of care  []  Update interventions per flow sheet       []  Discharge due to:_  []  Other:_      Brunilda Cadena, PT 12/15/2020  3:02 PM

## 2020-12-15 NOTE — PROGRESS NOTES
[facility]  [employer] OF [address]  [phone] fax    Plan of Care/ Statement of Necessity for Physical Therapy Services    Patient name: Layla Segovia Start of Care: 12/15/2020   Referral source: Sheryle Copper, MD : 1975    Medical Diagnosis: Morbid (severe) obesity due to excess calories [E66.01]  Low back pain [M54.5]  Radiculopathy, lumbar region [M54.16]  Spondylolisthesis, site unspecified [M43.10]  Payor: Anderson Castro / Plan: Austen Alvarado / Product Type: HUNTER /  Onset Date: Dec. 2020 (within two weeks prior to start of care)   Treatment Diagnosis: Low back pain   Prior Hospitalization: see medical history Provider#: [id number]   Medications: Verified on Patient summary List    Comorbidities: HTN   Prior Level of Function: working full time as 7th/, participating in recreational activities such as bowling and riding roller coasters     The Plan of Care and following information is based on the information from the initial evaluation. Assessment/ key information: Patient is a 44-year-old female who presents to therapy with chief complaint of low back pain. Patient was diagnosed with spondylolisthesis ~20 years ago; she reports exacerbations in symptoms mainly during the end-of-year holiday season when she has to do more lifting/carrying. She reports intermittent numbness and tingling to the LEs and muscle \"tightness\". She reports increase in symptoms with standing, lifting, carrying, and lumbar extension. Exam reveals decreased lumbar AROM, decreased hip PROM and AROM, decreased LE strength, and TTP of bilateral piriformis. Pelvic symmetry WNL; SLR (supine), sacral gapping, thigh thrust, BETHANIE, and FADIR tests negative. Patient would benefit from skilled PT to address above mentioned deficits to return to prior level of function, increase independence with ADLs, and improve overall quality of life.       Evaluation Complexity History MEDIUM  Complexity : 1-2 comorbidities / personal factors will impact the outcome/ POC ; Examination MEDIUM Complexity : 3 Standardized tests and measures addressing body structure, function, activity limitation and / or participation in recreation  ;Presentation MEDIUM Complexity : Evolving with changing characteristics  ; Clinical Decision Making MEDIUM Complexity : FOTO score of 26-74  Overall Complexity Rating: MEDIUM  Problem List: pain affecting function, decrease ROM, decrease strength, decrease ADL/ functional abilitiies, decrease activity tolerance, decrease flexibility/ joint mobility and decrease transfer abilities   Treatment Plan may include any combination of the following: Therapeutic exercise, Therapeutic activities, Neuromuscular re-education, Physical agent/modality, Gait/balance training, Manual therapy, Patient education, Self Care training, Functional mobility training, Home safety training and Stair training  Patient / Family readiness to learn indicated by: asking questions, participating in activities  Persons(s) to be included in education: patient  Barriers to Learning/Limitations: none  Patient Goal (s): to learn exercises to be able to manage symptoms on my own  Patient Self Reported Health Status: good  Rehabilitation Potential: good    Short Term Goals: To be accomplished in 1 weeks:   1. Patient will be compliant in initial HEP to optimize therapy outcomes. Long Term Goals: To be accomplished in 4 weeks:   1. Patient will improve FOTO score by at least 5 points to demonstrate functional improvement. 2. Patient will report at least a 50% increase in sitting tolerance since start of care in order to perform job duties. 3. Patient will report no greater than 2/10 pain in low back in order to improve tolerance to functional tasks. 4. Patient will demonstrate lumbar AROM WNL in order to increase ease of performing household chores.     Frequency / Duration: Patient to be seen 1-2 times per week for 4 weeks. Patient/ CarPatient/ Caregiver education and instruction: Diagnosis, prognosis, self care, activity modification and exercises   [x]  Plan of care has been reviewed with MAGGIE Alas, PT 12/15/2020 3:03 PM    ________________________________________________________________________    I certify that the above Therapy Services are being furnished while the patient is under my care. I agree with the treatment plan and certify that this therapy is necessary.     Physician's Signature:____________Date:_________TIME:________    ** Signature, Date and Time must be completed for valid certification **    Please sign and return to [facility]  [employer] [facility]  [address] [place] [address]  [phone] fax

## 2020-12-28 ENCOUNTER — APPOINTMENT (OUTPATIENT)
Dept: PHYSICAL THERAPY | Age: 45
End: 2020-12-28
Payer: COMMERCIAL

## 2021-01-28 NOTE — PROGRESS NOTES
[facility] Ginny Figures  [address] [facility]  [phone] fax    Physical Therapy Discharge Summary    Patient name: Winifred Cochran Start of Care: 12/15/2020   Referral source: Hasmukh Lion MD : 1975   Medical/Treatment Diagnosis: Low back pain [M54.5]  Payor: Mariam Crabtree / Plan: Deri Jackson / Product Type: HUNTER /  Onset Date:Dec. 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 Status at last note/certification: New goal-established at evaluation  Status at discharge: Unable to formally assess    Goal: Patient will improve FOTO score by at least 5 points to demonstrate functional improvement. Status at last note/certification: New [id number]  Status at discharge: Unable to formally assess    Goal: Patient will report at least a 50% increase in sitting tolerance since start of care in order to perform job duties. Status at last note/certification: New goal-patient reported increased back pain with sitting for work  Status at discharge: Unable to formally assess    Goal: Patient will report no greater than 2/10 pain in low back in order to improve tolerance to functional tasks. Status at last note/certification: New goal-patient reported max 4/10 pain  Status at discharge: Unable to formally assess    Goal: Patient will demonstrate lumbar AROM WNL in order to increase ease of performing household chores.   Status at last note/certification: New goal-lumbar AROM: flex and right SB 90% of full AROM; left SB and bilateral rotation 80% of full AROM  Status at discharge: Unable to formally assess      ASSESSMENT/RECOMMENDATIONS: Unable to formally assess goals with skilled outpatient PT. Patient did not return to therapy following initial evaluation on 12/15/2020.     [x]Discontinue therapy: []Patient has reached or is progressing toward set goals      [x]Patient is non-compliant or has abdicated      []Due to lack of appreciable progress towards set Mario Gustafson 11, PT 1/28/2021 12:32 PM

## 2021-10-19 ENCOUNTER — HOSPITAL ENCOUNTER (EMERGENCY)
Age: 46
Discharge: HOME | End: 2021-10-19
Attending: EMERGENCY MEDICINE
Payer: COMMERCIAL

## 2021-10-19 VITALS
BODY MASS INDEX: 31.83 KG/M2 | DIASTOLIC BLOOD PRESSURE: 96 MMHG | WEIGHT: 210 LBS | RESPIRATION RATE: 16 BRPM | TEMPERATURE: 98.3 F | HEART RATE: 80 BPM | OXYGEN SATURATION: 99 % | SYSTOLIC BLOOD PRESSURE: 180 MMHG | HEIGHT: 68 IN

## 2021-10-19 DIAGNOSIS — R30.0 DYSURIA: Primary | ICD-10-CM

## 2021-10-19 LAB
APPEARANCE UR: CLEAR
BILIRUB UR QL: NEGATIVE
COLOR UR: YELLOW
EPITH CASTS URNS QL MICRO: NORMAL /LPF (ref 0–5)
GLUCOSE UR STRIP.AUTO-MCNC: NEGATIVE MG/DL
HCG UR QL: NEGATIVE
HGB UR QL STRIP: ABNORMAL
KETONES UR QL STRIP.AUTO: NEGATIVE MG/DL
LEUKOCYTE ESTERASE UR QL STRIP.AUTO: NEGATIVE
NITRITE UR QL STRIP.AUTO: NEGATIVE
PH UR STRIP: 7 [PH] (ref 5–8)
PROT UR STRIP-MCNC: NEGATIVE MG/DL
RBC #/AREA URNS HPF: NORMAL /HPF (ref 0–5)
SP GR UR REFRACTOMETRY: <1.005 (ref 1–1.03)
UROBILINOGEN UR QL STRIP.AUTO: 0.2 EU/DL (ref 0.2–1)
WBC URNS QL MICRO: NORMAL /HPF (ref 0–4)

## 2021-10-19 PROCEDURE — 81025 URINE PREGNANCY TEST: CPT

## 2021-10-19 PROCEDURE — 81001 URINALYSIS AUTO W/SCOPE: CPT

## 2021-10-19 PROCEDURE — 87147 CULTURE TYPE IMMUNOLOGIC: CPT

## 2021-10-19 PROCEDURE — 87086 URINE CULTURE/COLONY COUNT: CPT

## 2021-10-19 PROCEDURE — 99283 EMERGENCY DEPT VISIT LOW MDM: CPT

## 2021-10-20 NOTE — ED PROVIDER NOTES
EMERGENCY DEPARTMENT HISTORY AND PHYSICAL EXAM    8:06 PM  Date: (Not on file)  Patient Name: Elsie Christopher    History of Presenting Illness     No chief complaint on file. History Provided By: Patient    HPI: Elsie Christopher is a 51 y.o. female with no significant past medical problems. Patient is presenting with bladder pressure with urination that started few hours ago. Denies dysuria, fever or chills. No vomiting or diarrhea. No flank pain. States that she has diffuse low back pain at baseline. Patient gets frequent UTIs and was concerned that she is having a UTI so she wanted to come and get checked. She is currently not in pain at all. No vaginal discharge or bleeding. Location:  Severity:  Timing/course: Onset/Duration:     PCP: Jagdeep Pugh MD    Past History     Past Medical History:  Past Medical History:   Diagnosis Date    Allergies     Ectopic pregnancy     Hypertension     Ill-defined condition     Graves Disease       Past Surgical History:  Past Surgical History:   Procedure Laterality Date    HX GYN      leep    HX GYN      d&c    HX GYN      elective     HX TUBAL LIGATION  2016       Family History:  Family History   Problem Relation Age of Onset    Diabetes Father     Alzheimer Mother        Social History:  Social History     Tobacco Use    Smoking status: Current Every Day Smoker     Packs/day: 0.50     Years: 20.00     Pack years: 10.00     Types: Cigarettes    Smokeless tobacco: Never Used   Substance Use Topics    Alcohol use: No     Alcohol/week: 0.0 standard drinks    Drug use: No       Allergies:   Allergies   Allergen Reactions    Latex Other (comments)     Latex comdons/ Topical burning      Amoxicillin-Pot Clavulanate Other (comments)     Yeast infection    Fluticasone Propion-Salmeterol Unknown (comments)     Pt states she is not allergic    Mometasone Unknown (comments)     Patient states she has not used this substance and is unaware of allergy to this       Review of Systems   Review of Systems   Genitourinary: Positive for pelvic pain. All other systems reviewed and are negative. Physical Exam     No data found. Physical Exam  Vitals and nursing note reviewed. Constitutional:       General: She is not in acute distress. Appearance: She is obese. HENT:      Head: Normocephalic and atraumatic. Eyes:      Extraocular Movements: Extraocular movements intact. Cardiovascular:      Rate and Rhythm: Normal rate. Pulmonary:      Effort: Pulmonary effort is normal. No respiratory distress. Abdominal:      Palpations: Abdomen is soft. Tenderness: There is no abdominal tenderness. There is no right CVA tenderness or left CVA tenderness. Musculoskeletal:         General: No tenderness. Normal range of motion. Cervical back: Normal range of motion and neck supple. Skin:     General: Skin is warm and dry. Neurological:      General: No focal deficit present. Mental Status: She is alert. Gait: Gait normal.   Psychiatric:         Mood and Affect: Mood normal.         Behavior: Behavior normal.         Diagnostic Study Results     Labs -  No results found for this or any previous visit (from the past 12 hour(s)). Radiologic Studies -   No results found. Medical Decision Making     ED Course: Progress Notes, Reevaluation, and Consults:    8:06 PM Initial assessment performed. The patients presenting problems have been discussed, and they/their family are in agreement with the care plan formulated and outlined with them. I have encouraged them to ask questions as they arise throughout their visit. Provider Notes (Medical Decision Making): 25-year-old female presenting with few hours duration of pressure while urinating. Well-appearing on exam and not in distress. Abdomen is soft and nontender. UA and urine pregnancy test were ordered.   No evidence of UTI on the urinalysis however given her symptoms I will send for culture and patient was instructed to follow-up. Provided with care instructions and return precautions. Procedures:     Critical Care Time:     Vital Signs-Reviewed the patient's vital signs. Reviewed pt's pulse ox reading. EKG: Interpreted by the EP. Time Interpreted:    Rate:    Rhythm:  Interpretation:   Comparison:     Records Reviewed: Nursing Notes (Time of Review: 8:06 PM)  -I am the first provider for this patient.  -I reviewed the vital signs, available nursing notes, past medical history, past surgical history, family history and social history. Current Outpatient Medications   Medication Sig Dispense Refill    esomeprazole (NexIUM) 40 mg capsule Take  by mouth daily.  ergocalciferol (Vitamin D2) 1,250 mcg (50,000 unit) capsule Take 50,000 Units by mouth.  ibuprofen (MOTRIN) 800 mg tablet Take 1 Tab by mouth three (3) times daily (with meals). 45 Tab 0    METHIMAZOLE PO Take 2.5 mg by mouth daily.  albuterol (PROVENTIL HFA, VENTOLIN HFA, PROAIR HFA) 90 mcg/actuation inhaler Take  by inhalation.  fluticasone (FLONASE) 50 mcg/actuation nasal spray 2 Sprays by Both Nostrils route daily. (Patient not taking: Reported on 12/8/2020) 1 Bottle 0    loratadine-pseudoephedrine (CLARITIN-D 24 HOUR)  mg per tablet Take 1 Tab by mouth daily. (Patient not taking: Reported on 12/8/2020) 10 Tab 0    amLODIPine (NORVASC) 5 mg tablet   3        Clinical Impression     Clinical Impression: No diagnosis found. Disposition: dc        This note was dictated utilizing voice recognition software which may lead to typographical errors. I apologize in advance if the situation occurs. If questions arise please do not hesitate to contact me or call our department.     Declan Hendricks MD  8:06 PM

## 2021-10-21 LAB
BACTERIA SPEC CULT: ABNORMAL
CC UR VC: ABNORMAL
SERVICE CMNT-IMP: ABNORMAL

## 2022-03-20 PROBLEM — E66.01 SEVERE OBESITY (HCC): Status: ACTIVE | Noted: 2020-12-08

## 2022-11-03 ENCOUNTER — HOSPITAL ENCOUNTER (EMERGENCY)
Age: 47
Discharge: HOME | End: 2022-11-03
Attending: EMERGENCY MEDICINE
Payer: COMMERCIAL

## 2022-11-03 VITALS
HEART RATE: 90 BPM | BODY MASS INDEX: 34.86 KG/M2 | RESPIRATION RATE: 19 BRPM | SYSTOLIC BLOOD PRESSURE: 163 MMHG | WEIGHT: 230 LBS | DIASTOLIC BLOOD PRESSURE: 97 MMHG | HEIGHT: 68 IN | OXYGEN SATURATION: 100 % | TEMPERATURE: 97.1 F

## 2022-11-03 DIAGNOSIS — N39.0 ACUTE UTI: Primary | ICD-10-CM

## 2022-11-03 LAB
APPEARANCE UR: ABNORMAL
BACTERIA URNS QL MICRO: ABNORMAL /HPF
BILIRUB UR QL: NEGATIVE
COLOR UR: YELLOW
EPITH CASTS URNS QL MICRO: ABNORMAL /LPF (ref 0–5)
GLUCOSE UR STRIP.AUTO-MCNC: NEGATIVE MG/DL
HGB UR QL STRIP: ABNORMAL
KETONES UR QL STRIP.AUTO: ABNORMAL MG/DL
LEUKOCYTE ESTERASE UR QL STRIP.AUTO: ABNORMAL
NITRITE UR QL STRIP.AUTO: POSITIVE
PH UR STRIP: 5.5 [PH] (ref 5–8)
PROT UR STRIP-MCNC: 30 MG/DL
RBC #/AREA URNS HPF: ABNORMAL /HPF (ref 0–5)
SP GR UR REFRACTOMETRY: 1.02 (ref 1–1.03)
UROBILINOGEN UR QL STRIP.AUTO: 1 EU/DL (ref 0.2–1)
WBC URNS QL MICRO: ABNORMAL /HPF (ref 0–4)

## 2022-11-03 PROCEDURE — 81001 URINALYSIS AUTO W/SCOPE: CPT

## 2022-11-03 PROCEDURE — 99283 EMERGENCY DEPT VISIT LOW MDM: CPT

## 2022-11-03 RX ORDER — PHENAZOPYRIDINE HYDROCHLORIDE 200 MG/1
200 TABLET, FILM COATED ORAL 3 TIMES DAILY
Qty: 6 TABLET | Refills: 0 | Status: SHIPPED | OUTPATIENT
Start: 2022-11-03 | End: 2022-11-05

## 2022-11-03 RX ORDER — CEPHALEXIN 500 MG/1
500 CAPSULE ORAL 4 TIMES DAILY
Qty: 28 CAPSULE | Refills: 0 | Status: SHIPPED | OUTPATIENT
Start: 2022-11-03 | End: 2022-11-10

## 2022-11-03 RX ORDER — FLUCONAZOLE 150 MG/1
150 TABLET ORAL
Qty: 1 TABLET | Refills: 0 | Status: SHIPPED | OUTPATIENT
Start: 2022-11-03 | End: 2022-11-03 | Stop reason: SDUPTHER

## 2022-11-03 RX ORDER — FLUCONAZOLE 150 MG/1
150 TABLET ORAL
Qty: 1 TABLET | Refills: 1 | Status: SHIPPED | OUTPATIENT
Start: 2022-11-03 | End: 2022-11-03

## 2022-11-03 NOTE — ED PROVIDER NOTES
EMERGENCY DEPARTMENT HISTORY AND PHYSICAL EXAM    Date: 11/3/2022  Patient Name: Linden Logan    History of Presenting Illness     Chief Complaint   Patient presents with    Urinary Pain         History Provided By: Patient    Additional History (Context): Linden Logan is a 49 y.o. female with hypertension, obesity, and graves disease  who presents with dysuria for about a week. Denies fever or flank pain or vaginal disc charge. She says to avoid UTIs she knows that she needs a void before having intercourse and then immediately thereafter and forgot this week. PCP: True Thompson MD    Current Outpatient Medications   Medication Sig Dispense Refill    cephALEXin (Keflex) 500 mg capsule Take 1 Capsule by mouth four (4) times daily for 7 days. 28 Capsule 0    fluconazole (Diflucan) 150 mg tablet Take 1 Tablet by mouth now for 1 dose. FDA advises cautious prescribing of oral fluconazole in pregnancy. Take after completing antibiotics. 1 Tablet 0    esomeprazole (NexIUM) 40 mg capsule Take  by mouth daily. ergocalciferol (Vitamin D2) 1,250 mcg (50,000 unit) capsule Take 50,000 Units by mouth. ibuprofen (MOTRIN) 800 mg tablet Take 1 Tab by mouth three (3) times daily (with meals). 45 Tab 0    METHIMAZOLE PO Take 2.5 mg by mouth daily. albuterol (PROVENTIL HFA, VENTOLIN HFA, PROAIR HFA) 90 mcg/actuation inhaler Take  by inhalation. fluticasone (FLONASE) 50 mcg/actuation nasal spray 2 Sprays by Both Nostrils route daily. (Patient not taking: Reported on 12/8/2020) 1 Bottle 0    loratadine-pseudoephedrine (CLARITIN-D 24 HOUR)  mg per tablet Take 1 Tab by mouth daily.  (Patient not taking: Reported on 12/8/2020) 10 Tab 0    amLODIPine (NORVASC) 5 mg tablet   3       Past History     Past Medical History:  Past Medical History:   Diagnosis Date    Allergies     Ectopic pregnancy     Hypertension     Ill-defined condition     Graves Disease       Past Surgical History:  Past Surgical History:   Procedure Laterality Date    HX GYN      leep    HX GYN      d&c    HX GYN      elective     HX TUBAL LIGATION  2016       Family History:  Family History   Problem Relation Age of Onset    Diabetes Father     Alzheimer's Disease Mother        Social History:  Social History     Tobacco Use    Smoking status: Every Day     Packs/day: 0.50     Years: 20.00     Pack years: 10.00     Types: Cigarettes    Smokeless tobacco: Never   Substance Use Topics    Alcohol use: No     Alcohol/week: 0.0 standard drinks    Drug use: No       Allergies: Allergies   Allergen Reactions    Latex Other (comments)     Latex comdons/ Topical burning      Amoxicillin-Pot Clavulanate Other (comments)     Yeast infection    Fluticasone Propion-Salmeterol Unknown (comments)     Pt states she is not allergic    Mometasone Unknown (comments)     Patient states she has not used this substance and is unaware of allergy to this         Review of Systems   Review of Systems   Constitutional:  Negative for fever. Genitourinary:  Positive for dysuria. Negative for flank pain and frequency. All Other Systems Negative  Physical Exam     Vitals:    22 1713 22 1714   BP: (!) 163/97    Pulse: 90    Resp: 19    Temp: 97.1 °F (36.2 °C)    SpO2: 100%    Weight:  104.3 kg (230 lb)   Height:  5' 8\" (1.727 m)     Physical Exam  Vitals and nursing note reviewed. Constitutional:       Appearance: She is well-developed. She is obese. HENT:      Head: Normocephalic and atraumatic. Right Ear: External ear normal.      Left Ear: External ear normal.      Nose: Nose normal.   Eyes:      Conjunctiva/sclera: Conjunctivae normal.      Pupils: Pupils are equal, round, and reactive to light. Neck:      Vascular: No JVD. Trachea: No tracheal deviation. Cardiovascular:      Rate and Rhythm: Normal rate and regular rhythm. Heart sounds: Normal heart sounds. No murmur heard. No friction rub. No gallop. Pulmonary:      Effort: Pulmonary effort is normal. No respiratory distress. Breath sounds: Normal breath sounds. No wheezing or rales. Abdominal:      General: Bowel sounds are normal. There is no distension. Palpations: Abdomen is soft. There is no mass. Tenderness: There is no abdominal tenderness. There is no right CVA tenderness, left CVA tenderness, guarding or rebound. Musculoskeletal:         General: No tenderness. Normal range of motion. Cervical back: Normal range of motion and neck supple. Skin:     General: Skin is warm and dry. Findings: No rash. Neurological:      Mental Status: She is alert and oriented to person, place, and time. Cranial Nerves: No cranial nerve deficit. Deep Tendon Reflexes: Reflexes are normal and symmetric. Psychiatric:         Behavior: Behavior normal.          Diagnostic Study Results     Labs -     Recent Results (from the past 12 hour(s))   URINALYSIS W/ RFLX MICROSCOPIC    Collection Time: 11/03/22  5:12 PM   Result Value Ref Range    Color YELLOW      Appearance CLOUDY      Specific gravity 1.022 1.005 - 1.030      pH (UA) 5.5 5.0 - 8.0      Protein 30 (A) NEG mg/dL    Glucose Negative NEG mg/dL    Ketone TRACE (A) NEG mg/dL    Bilirubin Negative NEG      Blood LARGE (A) NEG      Urobilinogen 1.0 0.2 - 1.0 EU/dL    Nitrites Positive (A) NEG      Leukocyte Esterase MODERATE (A) NEG     URINE MICROSCOPIC ONLY    Collection Time: 11/03/22  5:12 PM   Result Value Ref Range    WBC TOO NUMEROUS TO COUNT 0 - 4 /hpf    RBC 36 to 50 0 - 5 /hpf    Epithelial cells 3+ 0 - 5 /lpf    Bacteria 3+ (A) NEG /hpf       Radiologic Studies -   No orders to display     CT Results  (Last 48 hours)      None          CXR Results  (Last 48 hours)      None              Medical Decision Making   I am the first provider for this patient.     I reviewed the vital signs, available nursing notes, past medical history, past surgical history, family history and social history. Vital Signs-Reviewed the patient's vital signs. Records Reviewed: Nursing Notes    Procedures:  Procedures    Provider Notes (Medical Decision Making): Treat gram-negative maddy UTI with Keflex. Has tried previously and gets a yeast infection    Nursing notes we will send him also a prescription for Diflucan. MED RECONCILIATION:  No current facility-administered medications for this encounter. Current Outpatient Medications   Medication Sig    cephALEXin (Keflex) 500 mg capsule Take 1 Capsule by mouth four (4) times daily for 7 days. fluconazole (Diflucan) 150 mg tablet Take 1 Tablet by mouth now for 1 dose. FDA advises cautious prescribing of oral fluconazole in pregnancy. Take after completing antibiotics. esomeprazole (NexIUM) 40 mg capsule Take  by mouth daily. ergocalciferol (Vitamin D2) 1,250 mcg (50,000 unit) capsule Take 50,000 Units by mouth. ibuprofen (MOTRIN) 800 mg tablet Take 1 Tab by mouth three (3) times daily (with meals). METHIMAZOLE PO Take 2.5 mg by mouth daily. albuterol (PROVENTIL HFA, VENTOLIN HFA, PROAIR HFA) 90 mcg/actuation inhaler Take  by inhalation. fluticasone (FLONASE) 50 mcg/actuation nasal spray 2 Sprays by Both Nostrils route daily. (Patient not taking: Reported on 12/8/2020)    loratadine-pseudoephedrine (CLARITIN-D 24 HOUR)  mg per tablet Take 1 Tab by mouth daily. (Patient not taking: Reported on 12/8/2020)    amLODIPine (NORVASC) 5 mg tablet        Disposition:  home    DISCHARGE NOTE:   5:51 PM    Pt has been reexamined. Patient has no new complaints, changes, or physical findings. Care plan outlined and precautions discussed. Results of labs were reviewed with the patient. All medications were reviewed with the patient; will d/c home with keflex, diflucan. All of pt's questions and concerns were addressed.  Patient was instructed and agrees to follow up with PCP, as well as to return to the ED upon further deterioration. Patient is ready to go home. Follow-up Information       Follow up With Specialties Details Why Pedro Pablo Bustillos MD Internal Medicine Physician Schedule an appointment as soon as possible for a visit in 2 days  [address] [place] [address] EMERGENCY DEPT Emergency Medicine  If symptoms worsen return immediately [address]  [phone]            Current Discharge Medication List        START taking these medications    Details   cephALEXin (Keflex) 500 mg capsule Take 1 Capsule by mouth four (4) times daily for 7 days. Qty: 28 Capsule, Refills: 0  Start date: 11/3/2022, End date: 11/10/2022      fluconazole (Diflucan) 150 mg tablet Take 1 Tablet by mouth now for 1 dose. FDA advises cautious prescribing of oral fluconazole in pregnancy. Take after completing antibiotics. Qty: 1 Tablet, Refills: 0  Start date: 11/3/2022, End date: 11/3/2022             Diagnosis     Clinical Impression:   1.  Acute UTI

## 2022-11-03 NOTE — ED NOTES
6:22 PM  11/03/22     Discharge instructions given to patient (name) with verbalization of understanding. Patient accompanied by no one. Patient discharged with the following prescriptions sent to pharmacy. Patient discharged to home (destination).       [facility]

## 2023-05-07 ENCOUNTER — HOSPITAL ENCOUNTER (EMERGENCY)
Facility: HOSPITAL | Age: 48
Discharge: HOME | End: 2023-05-07
Attending: STUDENT IN AN ORGANIZED HEALTH CARE EDUCATION/TRAINING PROGRAM
Payer: COMMERCIAL

## 2023-05-07 VITALS
HEIGHT: 69 IN | SYSTOLIC BLOOD PRESSURE: 149 MMHG | HEART RATE: 91 BPM | BODY MASS INDEX: 34.07 KG/M2 | WEIGHT: 230 LBS | OXYGEN SATURATION: 100 % | DIASTOLIC BLOOD PRESSURE: 75 MMHG | TEMPERATURE: 98.2 F | RESPIRATION RATE: 17 BRPM

## 2023-05-07 DIAGNOSIS — K64.9 HEMORRHOIDS, UNSPECIFIED HEMORRHOID TYPE: Primary | ICD-10-CM

## 2023-05-07 PROCEDURE — 99282 EMERGENCY DEPT VISIT SF MDM: CPT | Performed by: STUDENT IN AN ORGANIZED HEALTH CARE EDUCATION/TRAINING PROGRAM

## 2023-05-07 ASSESSMENT — ENCOUNTER SYMPTOMS
ABDOMINAL PAIN: 0
VOMITING: 0
ANAL BLEEDING: 1
DIARRHEA: 0
BLOOD IN STOOL: 0
SHORTNESS OF BREATH: 0
CONSTIPATION: 1
RECTAL PAIN: 0
CHEST TIGHTNESS: 0
NAUSEA: 0

## 2023-05-07 ASSESSMENT — PAIN - FUNCTIONAL ASSESSMENT: PAIN_FUNCTIONAL_ASSESSMENT: NONE - DENIES PAIN

## 2023-06-02 ENCOUNTER — HOSPITAL ENCOUNTER (EMERGENCY)
Facility: HOSPITAL | Age: 48
Discharge: HOME | End: 2023-06-02
Attending: EMERGENCY MEDICINE
Payer: COMMERCIAL

## 2023-06-02 VITALS
WEIGHT: 230 LBS | OXYGEN SATURATION: 100 % | TEMPERATURE: 98 F | HEART RATE: 84 BPM | BODY MASS INDEX: 34.07 KG/M2 | HEIGHT: 69 IN | RESPIRATION RATE: 16 BRPM | DIASTOLIC BLOOD PRESSURE: 108 MMHG | SYSTOLIC BLOOD PRESSURE: 144 MMHG

## 2023-06-02 DIAGNOSIS — B37.0 THRUSH: Primary | ICD-10-CM

## 2023-06-02 DIAGNOSIS — J30.2 SEASONAL ALLERGIC RHINITIS, UNSPECIFIED TRIGGER: ICD-10-CM

## 2023-06-02 PROCEDURE — 99283 EMERGENCY DEPT VISIT LOW MDM: CPT | Performed by: NURSE PRACTITIONER

## 2023-06-02 RX ORDER — CETIRIZINE HYDROCHLORIDE 10 MG/1
10 TABLET ORAL DAILY
Qty: 30 TABLET | Refills: 0 | Status: SHIPPED | OUTPATIENT
Start: 2023-06-02 | End: 2023-07-02

## 2023-06-02 RX ORDER — FLUCONAZOLE 150 MG/1
150 TABLET ORAL DAILY
Qty: 3 TABLET | Refills: 0 | Status: SHIPPED | OUTPATIENT
Start: 2023-06-02 | End: 2023-06-05

## 2023-06-02 ASSESSMENT — ENCOUNTER SYMPTOMS
ABDOMINAL DISTENTION: 0
TROUBLE SWALLOWING: 0
BLOOD IN STOOL: 0
NAUSEA: 0
SHORTNESS OF BREATH: 0
APNEA: 0
RHINORRHEA: 1
EYES NEGATIVE: 1
SINUS PRESSURE: 0
CHEST TIGHTNESS: 0
SORE THROAT: 0
BACK PAIN: 0
ANAL BLEEDING: 0
CONSTIPATION: 0
SINUS PAIN: 0
ABDOMINAL PAIN: 0
DIARRHEA: 0
VOMITING: 0
FACIAL SWELLING: 0

## 2023-06-02 ASSESSMENT — LIFESTYLE VARIABLES
HOW OFTEN DO YOU HAVE A DRINK CONTAINING ALCOHOL: MONTHLY OR LESS
HOW MANY STANDARD DRINKS CONTAINING ALCOHOL DO YOU HAVE ON A TYPICAL DAY: 1 OR 2

## 2023-06-02 ASSESSMENT — PAIN DESCRIPTION - LOCATION: LOCATION: MOUTH

## 2023-06-02 ASSESSMENT — PAIN DESCRIPTION - DESCRIPTORS: DESCRIPTORS: DISCOMFORT

## 2023-06-02 ASSESSMENT — PAIN SCALES - GENERAL: PAINLEVEL_OUTOF10: 5

## 2023-06-02 ASSESSMENT — PAIN - FUNCTIONAL ASSESSMENT: PAIN_FUNCTIONAL_ASSESSMENT: 0-10

## 2023-06-02 NOTE — ED PROVIDER NOTES
Beverly [address]año [address]  [address] [address]      Phone: [phone]   cetirizine 10 MG tablet  fluconazole 150 MG tablet  nystatin [id number] UNIT/ML suspension          Dictation disclaimer:  Please note that this dictation was completed with [id number], the computer voice recognition software. Quite often unanticipated grammatical, syntax, homophones, and other interpretive errors are inadvertently transcribed by the computer software. Please disregard these errors. Please excuse any errors that have escaped final proofreading.          JEYSON Miranda NP  06/02/23 2338

## 2023-06-02 NOTE — ED TRIAGE NOTES
Pt to ED for eval of sore tongue that has white coating. Pt currently taking augmentin for sinus infection since last Friday.

## 2023-07-07 ENCOUNTER — APPOINTMENT (OUTPATIENT)
Facility: HOSPITAL | Age: 48
End: 2023-07-07
Payer: COMMERCIAL

## 2023-07-07 ENCOUNTER — HOSPITAL ENCOUNTER (EMERGENCY)
Facility: HOSPITAL | Age: 48
Discharge: HOME | End: 2023-07-07
Attending: EMERGENCY MEDICINE
Payer: COMMERCIAL

## 2023-07-07 VITALS
HEART RATE: 86 BPM | DIASTOLIC BLOOD PRESSURE: 81 MMHG | BODY MASS INDEX: 34.07 KG/M2 | WEIGHT: 230 LBS | SYSTOLIC BLOOD PRESSURE: 154 MMHG | OXYGEN SATURATION: 99 % | TEMPERATURE: 98.2 F | HEIGHT: 69 IN | RESPIRATION RATE: 16 BRPM

## 2023-07-07 DIAGNOSIS — M65.341 TRIGGER RING FINGER OF RIGHT HAND: Primary | ICD-10-CM

## 2023-07-07 PROCEDURE — 99283 EMERGENCY DEPT VISIT LOW MDM: CPT

## 2023-07-07 PROCEDURE — 73130 X-RAY EXAM OF HAND: CPT

## 2023-07-07 RX ORDER — IBUPROFEN 600 MG/1
600 TABLET ORAL 3 TIMES DAILY PRN
Qty: 30 TABLET | Refills: 0 | Status: SHIPPED | OUTPATIENT
Start: 2023-07-07

## 2023-07-07 ASSESSMENT — ENCOUNTER SYMPTOMS
SHORTNESS OF BREATH: 0
CHEST TIGHTNESS: 0

## 2023-07-07 ASSESSMENT — LIFESTYLE VARIABLES: HOW OFTEN DO YOU HAVE A DRINK CONTAINING ALCOHOL: NEVER

## 2023-07-07 ASSESSMENT — PAIN - FUNCTIONAL ASSESSMENT: PAIN_FUNCTIONAL_ASSESSMENT: 0-10

## 2023-07-07 ASSESSMENT — PAIN SCALES - GENERAL: PAINLEVEL_OUTOF10: 7

## 2023-07-07 ASSESSMENT — PAIN DESCRIPTION - LOCATION: LOCATION: FINGER (COMMENT WHICH ONE)

## 2023-07-07 NOTE — ED NOTES
Patient finger splint removed by patient, states she needs it off to safely drive. Patient has demonstrated ease at replacing finger in splint and verbalizes understanding when and how to use splint.       Laney Peters RN  07/07/23 1600

## 2023-07-07 NOTE — ED PROVIDER NOTES
as: NEXIUM     fluticasone 50 MCG/ACT nasal spray  Commonly known as: FLONASE     loratadine-pseudoephedrine  MG per extended release tablet  Commonly known as: CLARITIN-D 24HR     METHIMAZOLE PO               Where to Get Your Medications        These medications were sent to [address] [facility], [address]  [address] [place], [address] [facility] [address]      Phone: [phone]   ibuprofen 600 MG tablet         Follow-ups:  Barbara Lindsey MD  [address] [place] [address]       As needed    [facility] EMERGENCY DEPT  [address] [facility] [address] [phone]    If symptoms worsen            Carlos Begum PA-C  07/07/23 8639

## 2023-07-12 ENCOUNTER — TELEPHONE (OUTPATIENT)
Age: 48
End: 2023-07-12

## 2023-07-12 NOTE — TELEPHONE ENCOUNTER
11 AM on 7/19 at . Recommend her talk to PCP about something for pain in the interim as I have not seen this patient before.

## 2023-07-12 NOTE — TELEPHONE ENCOUNTER
Mihaela العلي called stating she was seen at [facility] ED 7/7 for rt ring trigger finger. A splint was placed and rx of Ibuprofen was ordered. She is stating the pain is getting unbearable and does not know if she can wait till next available. A referral is being sent by pt's PCP as well. Pt would like to know if she can be seen as soon as possible by anyone at the practice. Please advise pt at [phone].

## 2023-07-19 ENCOUNTER — OFFICE VISIT (OUTPATIENT)
Age: 48
End: 2023-07-19

## 2023-07-19 VITALS — BODY MASS INDEX: 33.97 KG/M2 | HEIGHT: 69 IN

## 2023-07-19 DIAGNOSIS — M65.341 TRIGGER RING FINGER OF RIGHT HAND: Primary | ICD-10-CM

## 2023-07-19 NOTE — PROGRESS NOTES
by name and date of birth   * Agreement on procedure being performed was verified  * Risks and Benefits explained to the patient  * Procedure site verified and marked as necessary  * Patient was positioned for comfort  * Consent was signed and verified     Time: 11:25 AM      Date of procedure: 7/19/2023    Procedure performed by: Anton Szymanski DO    Provider assisted by: Shavon Miller MA    Patient assisted by: self    How tolerated by patient: tolerated    Post Procedural Pain Scale:0    Comments: none    Procedure:  After consent was obtained, using sterile technique the right ring finger was prepped. Local anesthetic used: 1% Lidocaine Kenalog 5 mg and was then injected and the needle withdrawn. The procedure was well tolerated. The patient is asked to continue to rest the area for a few more days before resuming regular activities. It may be more painful for the first 1-2 days. Watch for fever, or increased swelling or persistent pain in the joint. Call or return to clinic prn if such symptoms occur or there is failure to improve as anticipated. Note: This note was completed using voice recognition software.   Any typographical/name errors or mistakes are unintentional.

## 2023-09-19 ENCOUNTER — OFFICE VISIT (OUTPATIENT)
Age: 48
End: 2023-09-19
Payer: COMMERCIAL

## 2023-09-19 VITALS — BODY MASS INDEX: 33.33 KG/M2 | HEIGHT: 69 IN | WEIGHT: 225 LBS

## 2023-09-19 DIAGNOSIS — M17.12 PATELLOFEMORAL ARTHRITIS OF LEFT KNEE: ICD-10-CM

## 2023-09-19 DIAGNOSIS — M25.562 LEFT KNEE PAIN, UNSPECIFIED CHRONICITY: Primary | ICD-10-CM

## 2023-09-19 PROCEDURE — 99214 OFFICE O/P EST MOD 30 MIN: CPT | Performed by: PHYSICIAN ASSISTANT

## 2023-09-19 PROCEDURE — 73564 X-RAY EXAM KNEE 4 OR MORE: CPT | Performed by: PHYSICIAN ASSISTANT

## 2023-09-19 RX ORDER — CARVEDILOL 6.25 MG/1
6.25 TABLET ORAL 2 TIMES DAILY WITH MEALS
COMMUNITY
Start: 2023-07-29

## 2023-09-19 RX ORDER — LANSOPRAZOLE 30 MG/1
CAPSULE, DELAYED RELEASE ORAL
COMMUNITY

## 2023-09-19 RX ORDER — LANSOPRAZOLE 30 MG/1
30 CAPSULE, DELAYED RELEASE ORAL EVERY MORNING
COMMUNITY
Start: 2023-09-08

## 2023-09-19 RX ORDER — FLUOROMETHOLONE 0.1 %
SUSPENSION, DROPS(FINAL DOSAGE FORM)(ML) OPHTHALMIC (EYE)
COMMUNITY
Start: 2023-07-26

## 2023-09-19 RX ORDER — COVID-19 ANTIGEN TEST
KIT MISCELLANEOUS
COMMUNITY
Start: 2023-08-24

## 2023-09-19 RX ORDER — MELOXICAM 15 MG/1
15 TABLET ORAL DAILY
Qty: 30 TABLET | Refills: 2 | Status: SHIPPED | OUTPATIENT
Start: 2023-09-19

## 2023-09-19 RX ORDER — ESCITALOPRAM OXALATE 5 MG/1
5 TABLET ORAL DAILY
COMMUNITY
Start: 2023-07-26

## 2023-10-16 ENCOUNTER — HOSPITAL ENCOUNTER (EMERGENCY)
Facility: HOSPITAL | Age: 48
Discharge: HOME | End: 2023-10-16
Payer: COMMERCIAL

## 2023-10-16 VITALS
OXYGEN SATURATION: 98 % | HEART RATE: 77 BPM | HEIGHT: 69 IN | BODY MASS INDEX: 33.33 KG/M2 | TEMPERATURE: 98 F | WEIGHT: 225 LBS | DIASTOLIC BLOOD PRESSURE: 86 MMHG | SYSTOLIC BLOOD PRESSURE: 132 MMHG | RESPIRATION RATE: 17 BRPM

## 2023-10-16 DIAGNOSIS — J34.89 SINUS PAIN: ICD-10-CM

## 2023-10-16 DIAGNOSIS — J34.89 SINUS PRESSURE: Primary | ICD-10-CM

## 2023-10-16 DIAGNOSIS — R09.82 POST-NASAL DRIP: ICD-10-CM

## 2023-10-16 PROCEDURE — 99283 EMERGENCY DEPT VISIT LOW MDM: CPT

## 2023-10-16 RX ORDER — DOXYCYCLINE HYCLATE 100 MG
100 TABLET ORAL 2 TIMES DAILY
Qty: 14 TABLET | Refills: 0 | Status: SHIPPED | OUTPATIENT
Start: 2023-10-16 | End: 2023-10-23

## 2023-10-16 RX ORDER — PREDNISONE 50 MG/1
50 TABLET ORAL DAILY
Qty: 5 TABLET | Refills: 0 | Status: SHIPPED | OUTPATIENT
Start: 2023-10-16 | End: 2023-10-21

## 2023-10-16 ASSESSMENT — ENCOUNTER SYMPTOMS
RHINORRHEA: 1
SORE THROAT: 1
EYE DISCHARGE: 0
SHORTNESS OF BREATH: 0
VOICE CHANGE: 0
FACIAL SWELLING: 0
NAUSEA: 0
TROUBLE SWALLOWING: 0
SINUS PAIN: 1
WHEEZING: 0
DIARRHEA: 0
SINUS PRESSURE: 1
ABDOMINAL DISTENTION: 0
VOMITING: 0

## 2023-10-16 NOTE — ED TRIAGE NOTES
Patients to the ed for possible sinus infection. Pt complains of ear pain, irritated throat, congestion, and facial pressure. Pt states that it all started yesterday evening.

## 2023-10-16 NOTE — ED PROVIDER NOTES
Notes (Medical Decision Making):     Differential Diagnosis:  influenza, mononucleosis, acute bronchitis, URI, streptococcal pharyngitis, pneumonia, asthma exacerbation, allergic rhinitis, seasonal allergies, COVID    Plan: Patient reports that typically with her current symptoms the only relief she typically gets is with antibiotics. Have encouraged patient to try taking a half of Zyrtec and see how she does. Have encouraged her to be compliant with her Flonase. Will discharge home with doxycycline. Have encouraged close allergy follow-up. She agrees. Will discharge home. MED RECONCILIATION:  No current facility-administered medications for this encounter. Current Outpatient Medications   Medication Sig    predniSONE (DELTASONE) 50 MG tablet Take 1 tablet by mouth daily for 5 days    doxycycline hyclate (VIBRA-TABS) 100 MG tablet Take 1 tablet by mouth 2 times daily for 7 days    carvedilol (COREG) 6.25 MG tablet Take 1 tablet by mouth with breakfast and with evening meal    FLOWFLEX COVID-19 AG HOME TEST KIT TEST AS DIRECTED TODAY    escitalopram (LEXAPRO) 5 MG tablet Take 1 tablet by mouth daily    fluorometholone (FML) 0.1 % ophthalmic suspension INSTILL 1 DROP IN AFFECTED EYES 3 TIMES A DAY    lansoprazole (PREVACID) 30 MG delayed release capsule     lansoprazole (PREVACID) 30 MG delayed release capsule Take 1 capsule by mouth every morning    metoprolol tartrate (LOPRESSOR) 25 MG tablet Take 0.5 tablets by mouth 2 times daily    meloxicam (MOBIC) 15 MG tablet Take 1 tablet by mouth daily    ibuprofen (ADVIL;MOTRIN) 600 MG tablet Take 1 tablet by mouth 3 times daily as needed for Pain    amLODIPine (NORVASC) 5 MG tablet ceived the following from [facility]: Outside name: amLODIPine (NORVASC) 5 mg tablet    fluticasone (FLONASE) 50 MCG/ACT nasal spray 2 sprays by Nasal route daily       Disposition:  Home     DISCHARGE NOTE:   Pt has been reexamined.  Patient has no new

## 2023-11-17 ENCOUNTER — HOSPITAL ENCOUNTER (EMERGENCY)
Facility: HOSPITAL | Age: 48
Discharge: HOME | End: 2023-11-17
Payer: COMMERCIAL

## 2023-11-17 VITALS
HEART RATE: 77 BPM | HEIGHT: 69 IN | SYSTOLIC BLOOD PRESSURE: 168 MMHG | DIASTOLIC BLOOD PRESSURE: 91 MMHG | OXYGEN SATURATION: 100 % | WEIGHT: 235 LBS | BODY MASS INDEX: 34.8 KG/M2 | RESPIRATION RATE: 18 BRPM | TEMPERATURE: 98.2 F

## 2023-11-17 DIAGNOSIS — N39.0 ACUTE UTI: Primary | ICD-10-CM

## 2023-11-17 LAB
APPEARANCE UR: ABNORMAL
BACTERIA URNS QL MICRO: ABNORMAL /HPF
BILIRUB UR QL: NEGATIVE
COLOR UR: YELLOW
EPITH CASTS URNS QL MICRO: ABNORMAL /LPF (ref 0–5)
GLUCOSE UR STRIP.AUTO-MCNC: NEGATIVE MG/DL
HCG UR QL: NEGATIVE
HGB UR QL STRIP: ABNORMAL
KETONES UR QL STRIP.AUTO: ABNORMAL MG/DL
LEUKOCYTE ESTERASE UR QL STRIP.AUTO: ABNORMAL
NITRITE UR QL STRIP.AUTO: POSITIVE
PH UR STRIP: 6 (ref 5–8)
PROT UR STRIP-MCNC: 30 MG/DL
RBC #/AREA URNS HPF: ABNORMAL /HPF (ref 0–5)
SP GR UR REFRACTOMETRY: 1.02 (ref 1–1.03)
UROBILINOGEN UR QL STRIP.AUTO: 1 EU/DL (ref 0.2–1)
WBC URNS QL MICRO: ABNORMAL /HPF (ref 0–4)

## 2023-11-17 PROCEDURE — 99283 EMERGENCY DEPT VISIT LOW MDM: CPT

## 2023-11-17 PROCEDURE — 81001 URINALYSIS AUTO W/SCOPE: CPT

## 2023-11-17 PROCEDURE — 81025 URINE PREGNANCY TEST: CPT

## 2023-11-17 PROCEDURE — 87086 URINE CULTURE/COLONY COUNT: CPT

## 2023-11-17 RX ORDER — NITROFURANTOIN 25; 75 MG/1; MG/1
100 CAPSULE ORAL 2 TIMES DAILY
Qty: 10 CAPSULE | Refills: 0 | Status: SHIPPED | OUTPATIENT
Start: 2023-11-17 | End: 2023-11-22

## 2023-11-17 RX ORDER — PHENAZOPYRIDINE HYDROCHLORIDE 100 MG/1
100 TABLET, FILM COATED ORAL 3 TIMES DAILY PRN
Qty: 9 TABLET | Refills: 0 | Status: SHIPPED | OUTPATIENT
Start: 2023-11-17 | End: 2023-11-20

## 2023-11-17 ASSESSMENT — ENCOUNTER SYMPTOMS
GASTROINTESTINAL NEGATIVE: 1
ALLERGIC/IMMUNOLOGIC NEGATIVE: 1
RESPIRATORY NEGATIVE: 1
EYES NEGATIVE: 1

## 2023-11-17 ASSESSMENT — PAIN SCALES - GENERAL: PAINLEVEL_OUTOF10: 6

## 2023-11-17 ASSESSMENT — PAIN - FUNCTIONAL ASSESSMENT: PAIN_FUNCTIONAL_ASSESSMENT: 0-10

## 2023-11-17 NOTE — ED TRIAGE NOTES
Pt states that having urinary burning and pressure. Hx of UTIs. Denies fevers. Symptoms started today.

## 2023-11-17 NOTE — ED PROVIDER NOTES
[facility] EMERGENCY DEPT  EMERGENCY DEPARTMENT ENCOUNTER      Pt Name: Augie Hurd  MRN: [id number]  [address] [facility] 1975  Date of evaluation: 2023  Provider: NGHIA Vyas    CHIEF COMPLAINT       Chief Complaint   Patient presents with    Urinary Burning         HISTORY OF PRESENT ILLNESS   (Location/Symptom, Timing/Onset, Context/Setting, Quality, Duration, Modifying Factors, Severity)  Note limiting factors. Augie Hurd is a 51 y.o. female who presents to the emergency department with a complaint of dysuria today. On her menstrual cycle denies possibility of pregnancy fever flank pain. HPI    Nursing Notes were reviewed. REVIEW OF SYSTEMS    (2-9 systems for level 4, 10 or more for level 5)     Review of Systems   Constitutional:  Negative for fever. HENT: Negative. Eyes: Negative. Respiratory: Negative. Cardiovascular: Negative. Gastrointestinal: Negative. Endocrine: Negative. Genitourinary:  Positive for dysuria. Negative for flank pain. Musculoskeletal: Negative. Skin: Negative. Allergic/Immunologic: Negative. Neurological: Negative. Hematological: Negative. Psychiatric/Behavioral: Negative. Except as noted above the remainder of the review of systems was reviewed and negative.        PAST MEDICAL HISTORY     Past Medical History:   Diagnosis Date    Allergies     Ectopic pregnancy     Hypertension     Ill-defined condition     Graves Disease         SURGICAL HISTORY       Past Surgical History:   Procedure Laterality Date    GYN      d&c    GYN      leep    GYN      elective     TUBAL LIGATION  2016         CURRENT MEDICATIONS       Previous Medications    AMLODIPINE (NORVASC) 5 MG TABLET    ceived the following from [facility]: Outside name: amLODIPine (NORVASC) 5 mg tablet    CARVEDILOL (COREG) 6.25 MG TABLET    Take 1 tablet by mouth with breakfast and with evening meal    ESCITALOPRAM (LEXAPRO) 5 MG TABLET    Take

## 2023-11-19 LAB
BACTERIA SPEC CULT: ABNORMAL
BACTERIA SPEC CULT: ABNORMAL
CC UR VC: ABNORMAL
SERVICE CMNT-IMP: ABNORMAL

## 2023-12-28 ENCOUNTER — OFFICE VISIT (OUTPATIENT)
Age: 48
End: 2023-12-28
Payer: COMMERCIAL

## 2023-12-28 VITALS — TEMPERATURE: 97.8 F | WEIGHT: 253 LBS | BODY MASS INDEX: 37.47 KG/M2 | HEIGHT: 69 IN

## 2023-12-28 DIAGNOSIS — M65.341 TRIGGER RING FINGER OF RIGHT HAND: Primary | ICD-10-CM

## 2023-12-28 PROCEDURE — 20550 NJX 1 TENDON SHEATH/LIGAMENT: CPT | Performed by: ORTHOPAEDIC SURGERY

## 2023-12-28 NOTE — PROGRESS NOTES
Angela Emery is a 51 y.o. female right handed. Worker's Compensation and legal considerations: none    Chief Complaint   Patient presents with    Hand Pain     Right ring trigger finger     Pain Score:   7    HPI: Patient presents today for follow-up due to recurrence of right ring finger pain and locking. Initial HPI: Patient presents today with complaints of right ring finger pain and locking.     Date of onset: Early 2023  Injury: No  Prior Treatment:  Yes: Comment: Right ring trigger finger injection    ROS: Review of Systems - General ROS: negative except HPI    Past Medical History:   Diagnosis Date    Allergies     Ectopic pregnancy     Hypertension     Ill-defined condition     Graves Disease       Past Surgical History:   Procedure Laterality Date    GYN      d&c    GYN      leep    GYN      elective     TUBAL LIGATION  2016        Current Outpatient Medications   Medication Sig Dispense Refill    carvedilol (COREG) 6.25 MG tablet Take 1 tablet by mouth with breakfast and with evening meal      FLOWFLEX COVID-19 AG HOME TEST KIT TEST AS DIRECTED TODAY      escitalopram (LEXAPRO) 5 MG tablet Take 1 tablet by mouth daily      fluorometholone (FML) 0.1 % ophthalmic suspension INSTILL 1 DROP IN AFFECTED EYES 3 TIMES A DAY      lansoprazole (PREVACID) 30 MG delayed release capsule       lansoprazole (PREVACID) 30 MG delayed release capsule Take 1 capsule by mouth every morning      metoprolol tartrate (LOPRESSOR) 25 MG tablet Take 0.5 tablets by mouth 2 times daily      meloxicam (MOBIC) 15 MG tablet Take 1 tablet by mouth daily 30 tablet 2    ibuprofen (ADVIL;MOTRIN) 600 MG tablet Take 1 tablet by mouth 3 times daily as needed for Pain 30 tablet 0    amLODIPine (NORVASC) 5 MG tablet ceived the following from [facility]: Outside name: amLODIPine (NORVASC) 5 mg tablet      fluticasone (FLONASE) 50 MCG/ACT nasal spray 2 sprays by Nasal route daily       No current

## 2024-03-01 ENCOUNTER — HOSPITAL ENCOUNTER (OUTPATIENT)
Facility: HOSPITAL | Age: 49
Discharge: HOME | End: 2024-03-01
Payer: COMMERCIAL

## 2024-03-01 VITALS — BODY MASS INDEX: 37.03 KG/M2 | WEIGHT: 250 LBS | HEIGHT: 69 IN

## 2024-03-01 DIAGNOSIS — Z12.31 BREAST CANCER SCREENING BY MAMMOGRAM: ICD-10-CM

## 2024-03-01 PROCEDURE — 77063 BREAST TOMOSYNTHESIS BI: CPT

## 2024-03-20 ENCOUNTER — HOSPITAL ENCOUNTER (EMERGENCY)
Facility: HOSPITAL | Age: 49
Discharge: HOME | End: 2024-03-20
Payer: COMMERCIAL

## 2024-03-20 VITALS
SYSTOLIC BLOOD PRESSURE: 140 MMHG | RESPIRATION RATE: 16 BRPM | HEART RATE: 73 BPM | HEIGHT: 69 IN | DIASTOLIC BLOOD PRESSURE: 102 MMHG | BODY MASS INDEX: 32.58 KG/M2 | OXYGEN SATURATION: 96 % | TEMPERATURE: 98.4 F | WEIGHT: 220 LBS

## 2024-03-20 DIAGNOSIS — H10.13 ALLERGIC CONJUNCTIVITIS OF BOTH EYES: Primary | ICD-10-CM

## 2024-03-20 DIAGNOSIS — H00.015 HORDEOLUM EXTERNUM OF LEFT LOWER EYELID: ICD-10-CM

## 2024-03-20 PROCEDURE — 99283 EMERGENCY DEPT VISIT LOW MDM: CPT

## 2024-03-20 RX ORDER — EPINASTINE HCL 0.05 %
1 DROPS OPHTHALMIC (EYE) 2 TIMES DAILY
Qty: 0.7 ML | Refills: 0 | Status: SHIPPED | OUTPATIENT
Start: 2024-03-20 | End: 2024-03-27

## 2024-03-20 RX ORDER — ERYTHROMYCIN 5 MG/G
OINTMENT OPHTHALMIC
Qty: 1 EACH | Refills: 0 | Status: SHIPPED | OUTPATIENT
Start: 2024-03-20 | End: 2024-03-30

## 2024-03-20 ASSESSMENT — ENCOUNTER SYMPTOMS
STRIDOR: 0
SORE THROAT: 0
ABDOMINAL PAIN: 0
BACK PAIN: 0
NAUSEA: 0
EYE ITCHING: 1
VOMITING: 0
COUGH: 0
EYE REDNESS: 1
EYE PAIN: 1
WHEEZING: 0
SHORTNESS OF BREATH: 0

## 2024-03-20 ASSESSMENT — PAIN - FUNCTIONAL ASSESSMENT: PAIN_FUNCTIONAL_ASSESSMENT: NONE - DENIES PAIN

## 2024-03-21 NOTE — ED PROVIDER NOTES
EMERGENCY DEPARTMENT HISTORY AND PHYSICAL EXAM    Date: 3/20/2024  Patient Name: Brianna Shook    History of Presenting Illness     Chief Complaint   Patient presents with    Eye Problem         History Provided By: patient     Chief Complaint: eye redness and itching   Duration: few days  Timing:  acute  Location:bilateral eyes  Quality: itchy  Severity: moderate  Modifying Factors: none  Associated Symptoms: eye redness and lower lid soreness      Additional History (Context): Brianna Shook is a 48 y.o. female with PMH htn and Grave's disease who presents with c/o few days of bilateral eye itching redness and watering.  Patient states she noticed this morning that her left lower lid with swelling and she was concerned she might be getting a stye.  She denies wearing contact lenses or glasses.  Denies any injury to the eye.  No other complaints reported at this time    PCP: Anahy Miles MD    No current facility-administered medications for this encounter.     Current Outpatient Medications   Medication Sig Dispense Refill    erythromycin (ROMYCIN) 5 MG/GM ophthalmic ointment Apply to the left lower lid 5 times daily for 7 days 1 each 0    epinastine (ELESTAT) 0.05 % SOLN Place 1 drop into both eyes 2 times daily for 7 days 0.7 mL 0    carvedilol (COREG) 6.25 MG tablet Take 1 tablet by mouth with breakfast and with evening meal      FLOWFLEX COVID-19 AG HOME TEST KIT TEST AS DIRECTED TODAY      escitalopram (LEXAPRO) 5 MG tablet Take 1 tablet by mouth daily      fluorometholone (FML) 0.1 % ophthalmic suspension INSTILL 1 DROP IN AFFECTED EYES 3 TIMES A DAY      lansoprazole (PREVACID) 30 MG delayed release capsule       lansoprazole (PREVACID) 30 MG delayed release capsule Take 1 capsule by mouth every morning      metoprolol tartrate (LOPRESSOR) 25 MG tablet Take 0.5 tablets by mouth 2 times daily      meloxicam (MOBIC) 15 MG tablet Take 1 tablet by mouth daily 30 tablet 2    ibuprofen (ADVIL;MOTRIN)

## 2024-03-21 NOTE — ED NOTES
Health Maintenance Due   Topic Date Due   • Shingles Vaccine (1 of 2) 09/13/2010   • Influenza Vaccine (1) 09/01/2020       Patient is due for topics as listed above but is not proceeding with Immunization(s) Influenza and Shingles at this time.   Over the last 2 weeks, how often have you been bothered by the following problems?          PHQ2 Score: 0  PHQ2 Score Interpretation: No further screening needed  1. Little interest or pleasure in activity?: 0  2. Feeling down, depressed, or hopeless?: 0                  Patient demonstrates understanding of dc paperwork and instructions. Pt left in stable condition with all belongings. VSS, NAD. Pt left via  ambulatory .

## 2024-09-16 ENCOUNTER — OFFICE VISIT (OUTPATIENT)
Age: 49
End: 2024-09-16
Payer: COMMERCIAL

## 2024-09-16 VITALS — WEIGHT: 240 LBS | BODY MASS INDEX: 35.55 KG/M2 | HEIGHT: 69 IN

## 2024-09-16 DIAGNOSIS — M79.641 BILATERAL HAND PAIN: Primary | ICD-10-CM

## 2024-09-16 DIAGNOSIS — Z01.818 PREOP EXAMINATION: Primary | ICD-10-CM

## 2024-09-16 DIAGNOSIS — M65.341 TRIGGER RING FINGER OF RIGHT HAND: ICD-10-CM

## 2024-09-16 DIAGNOSIS — M79.642 BILATERAL HAND PAIN: Primary | ICD-10-CM

## 2024-09-16 DIAGNOSIS — M65.332 TRIGGER FINGER, LEFT MIDDLE FINGER: ICD-10-CM

## 2024-09-16 PROCEDURE — 20550 NJX 1 TENDON SHEATH/LIGAMENT: CPT

## 2024-09-16 PROCEDURE — 73130 X-RAY EXAM OF HAND: CPT

## 2024-09-16 PROCEDURE — 99214 OFFICE O/P EST MOD 30 MIN: CPT

## 2024-09-16 RX ORDER — LIDOCAINE HYDROCHLORIDE 10 MG/ML
0.5 INJECTION, SOLUTION INFILTRATION; PERINEURAL ONCE
Status: COMPLETED | OUTPATIENT
Start: 2024-09-16 | End: 2024-09-16

## 2024-09-16 RX ADMIN — LIDOCAINE HYDROCHLORIDE 0.5 ML: 10 INJECTION, SOLUTION INFILTRATION; PERINEURAL at 09:37

## 2024-11-21 ENCOUNTER — PREP FOR PROCEDURE (OUTPATIENT)
Age: 49
End: 2024-11-21

## 2024-11-21 DIAGNOSIS — M65.341 TRIGGER RING FINGER OF RIGHT HAND: ICD-10-CM
